# Patient Record
Sex: MALE | Race: BLACK OR AFRICAN AMERICAN | NOT HISPANIC OR LATINO | Employment: FULL TIME | URBAN - METROPOLITAN AREA
[De-identification: names, ages, dates, MRNs, and addresses within clinical notes are randomized per-mention and may not be internally consistent; named-entity substitution may affect disease eponyms.]

---

## 2020-08-12 ENCOUNTER — OFFICE VISIT (OUTPATIENT)
Dept: OPTOMETRY | Facility: CLINIC | Age: 30
End: 2020-08-12
Payer: COMMERCIAL

## 2020-08-12 DIAGNOSIS — Z46.0 FITTING AND ADJUSTMENT OF SPECTACLES AND CONTACT LENSES: Primary | ICD-10-CM

## 2020-08-12 DIAGNOSIS — H52.13 MYOPIA OF BOTH EYES: Primary | ICD-10-CM

## 2020-08-12 PROCEDURE — 99999 PR PBB SHADOW E&M-NEW PATIENT-LVL II: CPT | Mod: PBBFAC,,, | Performed by: OPTOMETRIST

## 2020-08-12 PROCEDURE — 92004 COMPRE OPH EXAM NEW PT 1/>: CPT | Mod: S$GLB,,, | Performed by: OPTOMETRIST

## 2020-08-12 PROCEDURE — 92004 PR EYE EXAM, NEW PATIENT,COMPREHESV: ICD-10-PCS | Mod: S$GLB,,, | Performed by: OPTOMETRIST

## 2020-08-12 PROCEDURE — 99999 PR PBB SHADOW E&M-NEW PATIENT-LVL II: ICD-10-PCS | Mod: PBBFAC,,, | Performed by: OPTOMETRIST

## 2020-08-12 PROCEDURE — 92015 PR REFRACTION: ICD-10-PCS | Mod: S$GLB,,, | Performed by: OPTOMETRIST

## 2020-08-12 PROCEDURE — 92015 DETERMINE REFRACTIVE STATE: CPT | Mod: S$GLB,,, | Performed by: OPTOMETRIST

## 2020-08-12 PROCEDURE — 92310 PR CONTACT LENS FITTING (NO CHANGE): ICD-10-PCS | Mod: CSM,,, | Performed by: OPTOMETRIST

## 2020-08-12 PROCEDURE — 92310 CONTACT LENS FITTING OU: CPT | Mod: CSM,,, | Performed by: OPTOMETRIST

## 2020-08-12 NOTE — PROGRESS NOTES
MONIKA FAGAN; 2019  Pt states no [facility] problems with his glasses hasn't worn contacts (OASYS) for   about 6 months. Pt would like to get new glasses, work glasses, and   contacts.   No f/f  No gtts   No sx      Last edited by Ernesto Woo, OD on 8/12/2020  1:53 PM. (History)        ROS     Negative for: Constitutional, Gastrointestinal, Neurological, Skin,   Genitourinary, Musculoskeletal, HENT, Endocrine, Cardiovascular, Eyes,   Respiratory, Psychiatric, Allergic/Imm, Heme/Lymph    Last edited by Ernesto Woo, OD on 8/12/2020  1:53 PM. (History)        Assessment /Plan     For exam results, see Encounter Report.    Myopia of both eyes      Good fit/[facility] q OASYS CLs--pt happy    PLAN:    1. Wrote new spex/CLRx  2. Continue Daily Wear schedule.  NO SLEEPING IN CONTACT LENSES. Clean and disinfect nightly.  exchange monthly.    3. rtc 1 yr

## 2020-11-09 ENCOUNTER — LAB VISIT (OUTPATIENT)
Dept: LAB | Facility: HOSPITAL | Age: 30
End: 2020-11-09
Attending: INTERNAL MEDICINE
Payer: COMMERCIAL

## 2020-11-09 ENCOUNTER — OFFICE VISIT (OUTPATIENT)
Dept: FAMILY MEDICINE | Facility: CLINIC | Age: 30
End: 2020-11-09
Payer: COMMERCIAL

## 2020-11-09 VITALS
SYSTOLIC BLOOD PRESSURE: 130 MMHG | TEMPERATURE: 98 F | HEIGHT: 65 IN | BODY MASS INDEX: 32.44 KG/M2 | HEART RATE: 69 BPM | WEIGHT: 194.69 LBS | DIASTOLIC BLOOD PRESSURE: 84 MMHG | OXYGEN SATURATION: 99 %

## 2020-11-09 DIAGNOSIS — Z00.00 ROUTINE ADULT HEALTH MAINTENANCE: ICD-10-CM

## 2020-11-09 DIAGNOSIS — Z11.4 ENCOUNTER FOR SCREENING FOR HUMAN IMMUNODEFICIENCY VIRUS (HIV): ICD-10-CM

## 2020-11-09 DIAGNOSIS — R10.13 DYSPEPSIA: ICD-10-CM

## 2020-11-09 DIAGNOSIS — Z23 NEED FOR DIPHTHERIA-TETANUS-PERTUSSIS (TDAP) VACCINE: ICD-10-CM

## 2020-11-09 DIAGNOSIS — Z11.59 ENCOUNTER FOR HEPATITIS C SCREENING TEST FOR LOW RISK PATIENT: ICD-10-CM

## 2020-11-09 DIAGNOSIS — Z23 NEEDS FLU SHOT: ICD-10-CM

## 2020-11-09 DIAGNOSIS — Z00.00 ROUTINE ADULT HEALTH MAINTENANCE: Primary | ICD-10-CM

## 2020-11-09 DIAGNOSIS — M54.40 ACUTE LEFT-SIDED LOW BACK PAIN WITH SCIATICA, SCIATICA LATERALITY UNSPECIFIED: ICD-10-CM

## 2020-11-09 LAB
25(OH)D3+25(OH)D2 SERPL-MCNC: 24 NG/ML (ref 30–96)
ALBUMIN SERPL BCP-MCNC: 4 G/DL (ref 3.5–5.2)
ALP SERPL-CCNC: 115 U/L (ref 55–135)
ALT SERPL W/O P-5'-P-CCNC: 16 U/L (ref 10–44)
ANION GAP SERPL CALC-SCNC: 7 MMOL/L (ref 8–16)
AST SERPL-CCNC: 16 U/L (ref 10–40)
BASOPHILS # BLD AUTO: 0.02 K/UL (ref 0–0.2)
BASOPHILS NFR BLD: 0.3 % (ref 0–1.9)
BILIRUB SERPL-MCNC: 0.5 MG/DL (ref 0.1–1)
BUN SERPL-MCNC: 11 MG/DL (ref 6–20)
CALCIUM SERPL-MCNC: 9.8 MG/DL (ref 8.7–10.5)
CHLORIDE SERPL-SCNC: 101 MMOL/L (ref 95–110)
CHOLEST SERPL-MCNC: 133 MG/DL (ref 120–199)
CHOLEST/HDLC SERPL: 2.6 {RATIO} (ref 2–5)
CO2 SERPL-SCNC: 31 MMOL/L (ref 23–29)
CREAT SERPL-MCNC: 1.1 MG/DL (ref 0.5–1.4)
CRP SERPL-MCNC: 13.8 MG/L (ref 0–8.2)
DIFFERENTIAL METHOD: NORMAL
EOSINOPHIL # BLD AUTO: 0.1 K/UL (ref 0–0.5)
EOSINOPHIL NFR BLD: 0.7 % (ref 0–8)
ERYTHROCYTE [DISTWIDTH] IN BLOOD BY AUTOMATED COUNT: 13.3 % (ref 11.5–14.5)
EST. GFR  (AFRICAN AMERICAN): >60 ML/MIN/1.73 M^2
EST. GFR  (NON AFRICAN AMERICAN): >60 ML/MIN/1.73 M^2
ESTIMATED AVG GLUCOSE: 114 MG/DL (ref 68–131)
GLUCOSE SERPL-MCNC: 90 MG/DL (ref 70–110)
HBA1C MFR BLD HPLC: 5.6 % (ref 4–5.6)
HCT VFR BLD AUTO: 46.6 % (ref 40–54)
HCV AB SERPL QL IA: NEGATIVE
HDLC SERPL-MCNC: 51 MG/DL (ref 40–75)
HDLC SERPL: 38.3 % (ref 20–50)
HGB BLD-MCNC: 15.4 G/DL (ref 14–18)
HIV 1+2 AB+HIV1 P24 AG SERPL QL IA: NEGATIVE
IMM GRANULOCYTES # BLD AUTO: 0.01 K/UL (ref 0–0.04)
IMM GRANULOCYTES NFR BLD AUTO: 0.1 % (ref 0–0.5)
LDLC SERPL CALC-MCNC: 72.6 MG/DL (ref 63–159)
LYMPHOCYTES # BLD AUTO: 2.1 K/UL (ref 1–4.8)
LYMPHOCYTES NFR BLD: 29.4 % (ref 18–48)
MCH RBC QN AUTO: 28.5 PG (ref 27–31)
MCHC RBC AUTO-ENTMCNC: 33 G/DL (ref 32–36)
MCV RBC AUTO: 86 FL (ref 82–98)
MONOCYTES # BLD AUTO: 0.5 K/UL (ref 0.3–1)
MONOCYTES NFR BLD: 7.5 % (ref 4–15)
NEUTROPHILS # BLD AUTO: 4.5 K/UL (ref 1.8–7.7)
NEUTROPHILS NFR BLD: 62 % (ref 38–73)
NONHDLC SERPL-MCNC: 82 MG/DL
NRBC BLD-RTO: 0 /100 WBC
PLATELET # BLD AUTO: 268 K/UL (ref 150–350)
PMV BLD AUTO: 10.1 FL (ref 9.2–12.9)
POTASSIUM SERPL-SCNC: 3.9 MMOL/L (ref 3.5–5.1)
PROT SERPL-MCNC: 8.5 G/DL (ref 6–8.4)
RBC # BLD AUTO: 5.4 M/UL (ref 4.6–6.2)
SODIUM SERPL-SCNC: 139 MMOL/L (ref 136–145)
TRIGL SERPL-MCNC: 47 MG/DL (ref 30–150)
WBC # BLD AUTO: 7.21 K/UL (ref 3.9–12.7)

## 2020-11-09 PROCEDURE — 90686 FLU VACCINE (QUAD) GREATER THAN OR EQUAL TO 3YO PRESERVATIVE FREE IM: ICD-10-PCS | Mod: S$GLB,,, | Performed by: INTERNAL MEDICINE

## 2020-11-09 PROCEDURE — 80053 COMPREHEN METABOLIC PANEL: CPT

## 2020-11-09 PROCEDURE — 85025 COMPLETE CBC W/AUTO DIFF WBC: CPT

## 2020-11-09 PROCEDURE — 90715 TDAP VACCINE 7 YRS/> IM: CPT | Mod: S$GLB,,, | Performed by: INTERNAL MEDICINE

## 2020-11-09 PROCEDURE — 99204 OFFICE O/P NEW MOD 45 MIN: CPT | Mod: 25,S$GLB,, | Performed by: INTERNAL MEDICINE

## 2020-11-09 PROCEDURE — 83036 HEMOGLOBIN GLYCOSYLATED A1C: CPT

## 2020-11-09 PROCEDURE — 90472 IMMUNIZATION ADMIN EACH ADD: CPT | Mod: S$GLB,,, | Performed by: INTERNAL MEDICINE

## 2020-11-09 PROCEDURE — 86703 HIV-1/HIV-2 1 RESULT ANTBDY: CPT

## 2020-11-09 PROCEDURE — 90715 TDAP VACCINE GREATER THAN OR EQUAL TO 7YO IM: ICD-10-PCS | Mod: S$GLB,,, | Performed by: INTERNAL MEDICINE

## 2020-11-09 PROCEDURE — 86803 HEPATITIS C AB TEST: CPT

## 2020-11-09 PROCEDURE — 90471 IMMUNIZATION ADMIN: CPT | Mod: S$GLB,,, | Performed by: INTERNAL MEDICINE

## 2020-11-09 PROCEDURE — 3008F PR BODY MASS INDEX (BMI) DOCUMENTED: ICD-10-PCS | Mod: CPTII,S$GLB,, | Performed by: INTERNAL MEDICINE

## 2020-11-09 PROCEDURE — 36415 COLL VENOUS BLD VENIPUNCTURE: CPT

## 2020-11-09 PROCEDURE — 86140 C-REACTIVE PROTEIN: CPT

## 2020-11-09 PROCEDURE — 99999 PR PBB SHADOW E&M-EST. PATIENT-LVL IV: ICD-10-PCS | Mod: PBBFAC,,, | Performed by: INTERNAL MEDICINE

## 2020-11-09 PROCEDURE — 82306 VITAMIN D 25 HYDROXY: CPT

## 2020-11-09 PROCEDURE — 3008F BODY MASS INDEX DOCD: CPT | Mod: CPTII,S$GLB,, | Performed by: INTERNAL MEDICINE

## 2020-11-09 PROCEDURE — 80061 LIPID PANEL: CPT

## 2020-11-09 PROCEDURE — 90472 TDAP VACCINE GREATER THAN OR EQUAL TO 7YO IM: ICD-10-PCS | Mod: S$GLB,,, | Performed by: INTERNAL MEDICINE

## 2020-11-09 PROCEDURE — 99999 PR PBB SHADOW E&M-EST. PATIENT-LVL IV: CPT | Mod: PBBFAC,,, | Performed by: INTERNAL MEDICINE

## 2020-11-09 PROCEDURE — 99204 PR OFFICE/OUTPT VISIT, NEW, LEVL IV, 45-59 MIN: ICD-10-PCS | Mod: 25,S$GLB,, | Performed by: INTERNAL MEDICINE

## 2020-11-09 PROCEDURE — 90471 FLU VACCINE (QUAD) GREATER THAN OR EQUAL TO 3YO PRESERVATIVE FREE IM: ICD-10-PCS | Mod: S$GLB,,, | Performed by: INTERNAL MEDICINE

## 2020-11-09 PROCEDURE — 90686 IIV4 VACC NO PRSV 0.5 ML IM: CPT | Mod: S$GLB,,, | Performed by: INTERNAL MEDICINE

## 2020-11-09 RX ORDER — PANTOPRAZOLE SODIUM 40 MG/1
40 TABLET, DELAYED RELEASE ORAL DAILY
Qty: 30 TABLET | Refills: 11 | Status: SHIPPED | OUTPATIENT
Start: 2020-11-09 | End: 2021-05-15 | Stop reason: ALTCHOICE

## 2020-11-09 RX ORDER — MELOXICAM 15 MG/1
15 TABLET ORAL DAILY
Qty: 30 TABLET | Refills: 0 | Status: SHIPPED | OUTPATIENT
Start: 2020-11-09 | End: 2020-12-05 | Stop reason: SDUPTHER

## 2020-11-09 RX ORDER — CYCLOBENZAPRINE HCL 5 MG
5 TABLET ORAL 3 TIMES DAILY PRN
Qty: 40 TABLET | Refills: 1 | Status: SHIPPED | OUTPATIENT
Start: 2020-11-09 | End: 2020-12-09

## 2020-11-09 RX ORDER — LACTOBACILLUS ACIDOPH-L.BULGARICUS 1 MILLION CELL CHEWABLE TABLET 1MM CELL
2 TABLET,CHEWABLE ORAL 2 TIMES DAILY WITH MEALS
Qty: 40 TABLET | Refills: 0 | COMMUNITY
Start: 2020-11-09 | End: 2021-05-15 | Stop reason: ALTCHOICE

## 2020-11-09 RX ORDER — L. ACIDOPHILUS/L.BULGARICUS 100MM CELL
1 GRANULES IN PACKET (EA) ORAL 2 TIMES DAILY
COMMUNITY
Start: 2020-11-09 | End: 2020-11-09 | Stop reason: SDUPTHER
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
## 2020-11-12 ENCOUNTER — PATIENT MESSAGE (OUTPATIENT)
Dept: FAMILY MEDICINE | Facility: CLINIC | Age: 30
End: 2020-11-12

## 2020-11-12 DIAGNOSIS — E55.9 VITAMIN D DEFICIENCY: Primary | ICD-10-CM

## 2020-11-12 RX ORDER — CHOLECALCIFEROL (VITAMIN D3) 25 MCG
1000 TABLET ORAL DAILY
Qty: 30 TABLET | Refills: 3 | Status: SHIPPED | OUTPATIENT
Start: 2020-11-12 | End: 2021-05-15 | Stop reason: ALTCHOICE

## 2020-11-13 NOTE — TELEPHONE ENCOUNTER
Most of your test results are acceptable and can be reviewed on follow up appointment. The only change needed in treatment would be a vit d supplement. No changes in treatment needed. Follow up as recommended. Please don't hesitate to call with any concerns.

## 2020-11-27 ENCOUNTER — OFFICE VISIT (OUTPATIENT)
Dept: FAMILY MEDICINE | Facility: CLINIC | Age: 30
End: 2020-11-27
Payer: COMMERCIAL

## 2020-11-27 VITALS
OXYGEN SATURATION: 96 % | HEART RATE: 88 BPM | TEMPERATURE: 98 F | BODY MASS INDEX: 32.99 KG/M2 | HEIGHT: 65 IN | SYSTOLIC BLOOD PRESSURE: 124 MMHG | DIASTOLIC BLOOD PRESSURE: 86 MMHG | WEIGHT: 198 LBS

## 2020-11-27 DIAGNOSIS — E55.9 VITAMIN D DEFICIENCY: ICD-10-CM

## 2020-11-27 DIAGNOSIS — R73.9 HYPERGLYCEMIA: ICD-10-CM

## 2020-11-27 DIAGNOSIS — M54.40 ACUTE LEFT-SIDED LOW BACK PAIN WITH SCIATICA, SCIATICA LATERALITY UNSPECIFIED: Primary | ICD-10-CM

## 2020-11-27 DIAGNOSIS — R10.13 DYSPEPSIA: ICD-10-CM

## 2020-11-27 PROCEDURE — 3008F BODY MASS INDEX DOCD: CPT | Mod: CPTII,S$GLB,, | Performed by: INTERNAL MEDICINE

## 2020-11-27 PROCEDURE — 3008F PR BODY MASS INDEX (BMI) DOCUMENTED: ICD-10-PCS | Mod: CPTII,S$GLB,, | Performed by: INTERNAL MEDICINE

## 2020-11-27 PROCEDURE — 1126F AMNT PAIN NOTED NONE PRSNT: CPT | Mod: S$GLB,,, | Performed by: INTERNAL MEDICINE

## 2020-11-27 PROCEDURE — 99214 OFFICE O/P EST MOD 30 MIN: CPT | Mod: S$GLB,,, | Performed by: INTERNAL MEDICINE

## 2020-11-27 PROCEDURE — 99999 PR PBB SHADOW E&M-EST. PATIENT-LVL IV: CPT | Mod: PBBFAC,,, | Performed by: INTERNAL MEDICINE

## 2020-11-27 PROCEDURE — 99214 PR OFFICE/OUTPT VISIT, EST, LEVL IV, 30-39 MIN: ICD-10-PCS | Mod: S$GLB,,, | Performed by: INTERNAL MEDICINE

## 2020-11-27 PROCEDURE — 99999 PR PBB SHADOW E&M-EST. PATIENT-LVL IV: ICD-10-PCS | Mod: PBBFAC,,, | Performed by: INTERNAL MEDICINE

## 2020-11-27 PROCEDURE — 1126F PR PAIN SEVERITY QUANTIFIED, NO PAIN PRESENT: ICD-10-PCS | Mod: S$GLB,,, | Performed by: INTERNAL MEDICINE

## 2020-11-27 NOTE — PROGRESS NOTES
Subjective:       Patient ID: Pasquale Birch is a 30 y.o. male.    Chief Complaint: No chief complaint on file.      This is a 30-year-old man who is here for follow-up of back pains and gastrointestinal symptoms.  Labs were stable except for mild decrease in vitamin-D.  Patient reports that his back and leg pains are better, down to a 1-2/10 occasional with changes in position.  Continues to take the muscle relaxer at night.  He is low abdominal pain is better with occasional soreness but no changes in bowel habits.  Was not able to get the probiotics.    Review of Systems   Constitutional: Negative for appetite change, chills, fatigue and fever.   HENT: Negative for nasal congestion, rhinorrhea and sore throat.    Respiratory: Negative for cough and shortness of breath.    Cardiovascular: Negative for chest pain, palpitations and leg swelling.   Gastrointestinal: Negative for abdominal pain (Low abdominal discomfort with bloating occasionally.), change in bowel habit, constipation, diarrhea, nausea, vomiting and change in bowel habit.   Genitourinary: Negative for bladder incontinence, difficulty urinating and dysuria.   Musculoskeletal: Positive for leg pain (Mild, 1-2/10 occasionally d on movement).   Neurological: Negative for dizziness, weakness, light-headedness, numbness and headaches.   Hematological: Does not bruise/bleed easily.   Psychiatric/Behavioral: Negative for sleep disturbance. The patient is not nervous/anxious.       No past medical history on file.    No past surgical history on file.    Family History   Problem Relation Age of Onset    Diabetes Mellitus Maternal Grandmother     Hypertension Maternal Grandmother        Social History     Socioeconomic History    Marital status: Single     Spouse name: Not on file    Number of children: Not on file    Years of education: Not on file    Highest education level: Not on file   Occupational History    Not on file   Social Needs    Financial  "resource strain: Not hard at all    Food insecurity     Worry: Never true     Inability: Never true    Transportation needs     Medical: No     Non-medical: No   Tobacco Use    Smoking status: Never Smoker    Smokeless tobacco: Never Used   Substance and Sexual Activity    Alcohol Use     Frequency: 2-4 times a month     Drinks per session: 1 or 2     Binge frequency: Never    Drug use: Not on file    Sexual activity: Not on file   Lifestyle    Physical activity     Days per week: 4 days     Minutes per session: 30 min    Stress: Not at all   Relationships    Social connections     Talks on phone: Twice a week     Gets together: Never     Attends Pentecostal service: Not on file     Active member of club or organization: No     Attends meetings of clubs or organizations: Never     Relationship status: Living with partner   Other Topics Concern    Not on file   Social History Narrative    Not on file       Current Outpatient Medications   Medication Sig Dispense Refill    cyclobenzaprine (FLEXERIL) 5 MG tablet Take 1 tablet (5 mg total) by mouth 3 (three) times daily as needed for Muscle spasms. 40 tablet 1    Lactobacillus acidoph-L.bulgar (LACTINEX) 1 million cell Chew Take 2 tablets by mouth 2 (two) times daily with meals. 40 tablet 0    meloxicam (MOBIC) 15 MG tablet Take 1 tablet (15 mg total) by mouth once daily. 30 tablet 0    pantoprazole (PROTONIX) 40 MG tablet Take 1 tablet (40 mg total) by mouth once daily. 30 tablet 11    vitamin D (VITAMIN D3) 1000 units Tab Take 1 tablet (1,000 Units total) by mouth once daily. 30 tablet 3     No current facility-administered medications for this visit.        Review of patient's allergies indicates:  No Known Allergies      Objective:       Last 3 sets of Vitals    Vitals - 1 value per visit 8/12/2020 11/9/2020   SYSTOLIC - 130   DIASTOLIC - 84   PULSE - 69   TEMPERATURE - 98.3   SPO2 - 99   Weight (lb) - 194.67   Weight (kg) - 88.3   HEIGHT - 5' 5" "   BODY MASS INDEX - 32.39   VISIT REPORT - -   Pain Score  0 -   Physical Exam  Constitutional:       Appearance: Normal appearance.   HENT:      Head: Normocephalic.   Eyes:      General: No scleral icterus.     Extraocular Movements: Extraocular movements intact.      Conjunctiva/sclera: Conjunctivae normal.      Pupils: Pupils are equal, round, and reactive to light.   Neck:      Musculoskeletal: Neck supple.      Vascular: No carotid bruit.   Cardiovascular:      Rate and Rhythm: Normal rate and regular rhythm.      Pulses: Normal pulses.      Heart sounds: Normal heart sounds.   Pulmonary:      Effort: Pulmonary effort is normal.      Breath sounds: Normal breath sounds.   Abdominal:      General: Bowel sounds are normal. There is no distension.      Palpations: Abdomen is soft. There is no mass.      Tenderness: There is no abdominal tenderness.   Musculoskeletal: Normal range of motion.         General: No swelling.   Skin:     General: Skin is warm.   Neurological:      General: No focal deficit present.      Mental Status: He is alert and oriented to person, place, and time.   Psychiatric:         Mood and Affect: Mood normal.         Behavior: Behavior normal.           CBC:  Recent Labs   Lab Result Units 11/09/20  0931   WBC K/uL 7.21   RBC M/uL 5.40   Hemoglobin g/dL 15.4   Hematocrit % 46.6   Platelets K/uL 268   MCV fL 86   MCH pg 28.5   MCHC g/dL 33.0     CMP:  Recent Labs   Lab Result Units 11/09/20  0931   Glucose mg/dL 90   Calcium mg/dL 9.8   Albumin g/dL 4.0   Total Protein g/dL 8.5*   Sodium mmol/L 139   Potassium mmol/L 3.9   CO2 mmol/L 31*   Chloride mmol/L 101   BUN mg/dL 11   Alkaline Phosphatase U/L 115   ALT U/L 16   AST U/L 16   Total Bilirubin mg/dL 0.5     URINALYSIS:  Recent Labs   Lab Result Units 11/09/20  0916   Color, UA  Yellow   Specific Gravity, UA  1.015   pH, UA  6.0   Protein, UA  Negative   Nitrite, UA  Negative   Leukocytes, UA  Negative   Urobilinogen, UA EU/dL Negative       LIPIDS:  Recent Labs   Lab Result Units 11/09/20  0931   HDL mg/dL 51   Cholesterol mg/dL 133   Triglycerides mg/dL 47   LDL Cholesterol mg/dL 72.6   HDL/Cholesterol Ratio % 38.3   Non-HDL Cholesterol mg/dL 82   Total Cholesterol/HDL Ratio  2.6     TSH:  No results for input(s): TSH in the last 2160 hours.    A1C:  Recent Labs   Lab 11/09/20  0931   Hemoglobin A1C 5.6           Assessment:       1. Acute left-sided low back pain with sciatica, sciatica laterality unspecified    2. Dyspepsia    3. Vitamin D deficiency    4. Hyperglycemia    5. BMI 32.0-32.9,adult        Plan:       Pasquale was seen today for follow-up.    Diagnoses and all orders for this visit:    Acute left-sided low back pain with sciatica, sciatica laterality unspecified   - Improved.  Continue muscle relaxers and anti-inflammatory as needed.    Dyspepsia   - Trial of probiotics.    Vitamin D deficiency  -     Vitamin D; Future  - Continue vitamin-D supplements.    Hyperglycemia  -    Noted with borderline hemoglobin A1c.  - lifestyle modification with diet and exercise with weight control.  -     Comprehensive Metabolic Panel; Future  -     Hemoglobin A1C; Future    BMI 32.0-32.9,adult  -     Lipid Panel; Future  -     TSH; Standing        - Lifestyle modification with diet and exercise with weight control.      Return to clinic within 6 months.

## 2020-11-27 NOTE — PATIENT INSTRUCTIONS
When you have type 2 diabetes, what you eat can help you control your blood sugar, stave off hunger, and feel full longer.  Diabetes is when your blood sugar or glucose levels are higher than normal. Its carbohydrate foods like breads, cereals, rice, pasta, fruits, milk, and desserts that can cause this rise.  Your eating plan should focus on the amount and type of carbs you put on your plate throughout the day.    1. Raw, Cooked, or Roasted Vegetables- These add color, flavor, and texture to a meal. Choose tasty, low-carb veggies, like mushrooms, onions, eggplant, tomatoes, [place] sprouts, and low-carb squashes, like zucchini. Try them with dips such as low-fat dressings, hummus, guacamole, and salsa, or roasted with different seasonings such as rosemary, cayenne pepper, or garlic.    2. Greens- Go beyond your regular salad and try kale, spinach, and chard. Theyre healthy, delicious, and low-carb. Roast kale leaves in the oven with olive oil for quick, crunchy chips. You can also mix greens in with roasted veggies to add texture and a different flavor, or serve them with a little protein, like salmon.    3. Flavorful, Low-calorie Drinks- Plain water is always good, but water infused with fruits and vegetables is more interesting. Cut up a lemon or cucumber and put it in your water, or make ice cubes with some flavoring in them.  If youre not a hot tea drinker, try cold tea with lemon or a cinnamon stick. Not only are these beverages low-carb, they can also help fill you up so you dont crave other foods.    4. Berries- Did you know that 1 cup of either of these has just 15 grams of carbs?  Its a little more expensive, but its a healthy treat packed with nutrients and fiber, and its a little bit sweet.  For a different twist, mix the berries with plain yogurt, or put them in ice cubes.    5. Whole-grain, Higher-fiber Foods- Try legumes like dried beans, peas, and lentils. These foods still have carbs, but  "they have interesting flavors that help keep you satisfied.    6. A Little Fat- Good fat choices include olive oil, avocado, and fatty fishes -- think salmon served on of a bed of lettuce, for example.    7. Protein- Greek yogurt, cottage cheese, eggs, and lean meats.     And dont forget treats- Peanut butter on a celery stick is a good fat and protein mix for a healthy, satisfying snack. You can also snack on a lower-fat cheese stick or a beef jerky stick -- but keep an eye on how much sodium is in them.    _________________________________________________    What it Takes to Lose Weight     What does it take to lose weight?  To lose weight, you have to eat fewer calories than your body uses. Calories are the amount of energy in the food you eat. Some foods have more calories than others. For example, foods that are high in fat and sugar are also high in calories. If you eat more calories than your body uses, the extra calories will be stored as body excess fat.  A pound of fat is about 3,500 calories. To lose 1 pound of fat in a week, you have to eat 3,500 fewer calories (that is 500 fewer calories a day), or you have to "burn off" an extra 3,500 calories. You can burn off calories by exercising or just by being more active. (Talk to your family doctor before you begin any type of exercise program. Your doctor can help you determine what kind of exercise program is right for you.)  The best way to lose weight and keep it off is to eat fewer calories and be active. If you cut 250 calories from your diet each day and exercise enough to burn off 250 calories, that adds up to 500 fewer calories in one day. If you do this for 7 days, you can lose 1 pound of fat in a week.  Many experts believe you should not try to lose more than 2 pounds per week. Losing more than 2 pounds in a week usually means that you are losing water weight and lean muscle mass instead of losing excess fat. If you do this, you will have less " "energy, and you will most likely gain the weight back.    How often should I eat?  Most people can eat 3 regular meals and 1 snack every day. The 3 meals should be about the same size and should be low in fat. Try to eat 1 to 2 cups of fruits and vegetables, 2 to 3 ounces of whole grains and 1 to 2 ounces of meat (or a meat alternative) at most meals.  Some people benefit more if they eat 5 to 6 smaller meals throughout the day, about 2 to 3 hours apart. For example, their first meal of the day might be a cup of low-fat or nonfat yogurt and a banana. Three hours later they might eat a simple deli sandwich with whole-grain bread and fat-free mayonnaise.  Eat breakfast and don't skip meals. While skipping meals may help you lose weight in the beginning, it fails in the long run. Skipping meals may make you feel too hungry later in the day, causing you to overeat at your next meal.  After about a month of eating a normal breakfast, lunch and a light dinner, your body will adjust.    What is so bad about high-fat foods?  Foods high in fat are usually high in calories, which could lead to unwanted weight gain. Consuming too much saturated and trans fats may increase LDL cholesterol ("bad" cholesterol) level, and increase your risk of heart disease. The USDA suggests that you eat no more than 20 grams of saturated fat, and that you limit the amount of trans fat to as close to 0 grams as possible. Click here for more information on reading nutrition labels.  It is important to remember that some fats can be beneficial to your overall health. "Good" fat, such as polyunsaturated and monounsaturated fats are found in fish, nuts, and low- or nonfat dairy products.    What are empty calories?  Some foods are referred to as "empty calories" because they add lots of calories to your diet without providing much nutritional benefit. An example is sugar-sweetened drinks, such as fruit juice, fruit drinks, regular soft drinks, sports " "drinks, energy drinks, sweetened or flavored milk and sweetened iced tea. These drinks can add lots of sugar and calories to your diet.  But staying hydrated is important for good health. To reduce the calories and sugar in your diet, substitute water, zero-calorie flavored water, non-fat or reduced-fat milk, unsweetened tea or diet soda for sweetened drinks. Talk with your family doctor or a dietitian if you have questions about your diet or healthy eating for your family.    Can I trust nutrition information I get from newspapers and magazines?  Nutrition tips and diet information from different sources often conflict with each other. You should always check with your doctor first. Also, keep in mind the following advice:  There is no "magic bullet" when it comes to nutrition. There isn't one single diet that works for every person. You need to find an eating plan that works for you.   Good nutrition doesn't come in a vitamin supplement. Only take a vitamin with your doctor's recommendation, as your body benefits the most when you eat healthy foods.   Eating all different kinds of foods is best for your body, so learn to try new foods.   Fad diets offer short-term changes, but good health comes from long-term effort and commitment.   Stories from people who have used a diet program or product, especially in commercials and [employer], are advertisements. These people are usually paid to endorse what the ad is selling. Remember, regained weight or other problems that develop after someone has completed the diet program are never talked about in those ads.     Will diet drugs help?  Although diet drugs may help you lose weight at first, they usually don't help you keep the weight off and may have damaging side effects. Most diet pills have not been tested by the Food and Drug Administration, which means you can't be sure if the drugs are safe. Taking drugs also does not help you learn how to change your eating and " exercise habits. Making lasting changes in these habits is the way to lose weight and keep it off.

## 2021-04-22 ENCOUNTER — PATIENT MESSAGE (OUTPATIENT)
Dept: FAMILY MEDICINE | Facility: CLINIC | Age: 31
End: 2021-04-22

## 2021-05-15 ENCOUNTER — OFFICE VISIT (OUTPATIENT)
Dept: URGENT CARE | Facility: CLINIC | Age: 31
End: 2021-05-15
Payer: COMMERCIAL

## 2021-05-15 VITALS
SYSTOLIC BLOOD PRESSURE: 136 MMHG | DIASTOLIC BLOOD PRESSURE: 90 MMHG | RESPIRATION RATE: 16 BRPM | HEART RATE: 84 BPM | TEMPERATURE: 99 F | OXYGEN SATURATION: 96 %

## 2021-05-15 DIAGNOSIS — Z11.9 SCREENING EXAMINATION FOR UNSPECIFIED INFECTIOUS DISEASE: Primary | ICD-10-CM

## 2021-05-15 DIAGNOSIS — U07.1 COVID-19 VIRUS DETECTED: ICD-10-CM

## 2021-05-15 DIAGNOSIS — U07.1 COVID-19 VIRUS INFECTION: ICD-10-CM

## 2021-05-15 LAB
CTP QC/QA: YES
SARS-COV-2 RDRP RESP QL NAA+PROBE: POSITIVE

## 2021-05-15 PROCEDURE — 99214 PR OFFICE/OUTPT VISIT, EST, LEVL IV, 30-39 MIN: ICD-10-PCS | Mod: S$GLB,CS,, | Performed by: NURSE PRACTITIONER

## 2021-05-15 PROCEDURE — 99214 OFFICE O/P EST MOD 30 MIN: CPT | Mod: S$GLB,CS,, | Performed by: NURSE PRACTITIONER

## 2021-05-15 PROCEDURE — U0002 COVID-19 LAB TEST NON-CDC: HCPCS | Mod: QW,S$GLB,, | Performed by: NURSE PRACTITIONER

## 2021-05-15 PROCEDURE — U0002: ICD-10-PCS | Mod: QW,S$GLB,, | Performed by: NURSE PRACTITIONER

## 2022-01-10 ENCOUNTER — PATIENT MESSAGE (OUTPATIENT)
Dept: ADMINISTRATIVE | Facility: HOSPITAL | Age: 32
End: 2022-01-10
Payer: COMMERCIAL

## 2022-02-22 ENCOUNTER — PATIENT MESSAGE (OUTPATIENT)
Dept: RESEARCH | Facility: HOSPITAL | Age: 32
End: 2022-02-22
Payer: COMMERCIAL

## 2022-03-08 ENCOUNTER — PATIENT MESSAGE (OUTPATIENT)
Dept: OPTOMETRY | Facility: CLINIC | Age: 32
End: 2022-03-08
Payer: COMMERCIAL

## 2022-03-18 ENCOUNTER — OFFICE VISIT (OUTPATIENT)
Dept: OPTOMETRY | Facility: CLINIC | Age: 32
End: 2022-03-18
Payer: COMMERCIAL

## 2022-03-18 DIAGNOSIS — H52.13 MYOPIA OF BOTH EYES: Primary | ICD-10-CM

## 2022-03-18 DIAGNOSIS — Z97.3 WEARS CONTACT LENSES: ICD-10-CM

## 2022-03-18 DIAGNOSIS — Z46.0 FITTING AND ADJUSTMENT OF SPECTACLES AND CONTACT LENSES: Primary | ICD-10-CM

## 2022-03-18 PROCEDURE — 92014 COMPRE OPH EXAM EST PT 1/>: CPT | Mod: S$GLB,,, | Performed by: OPTOMETRIST

## 2022-03-18 PROCEDURE — 99999 PR PBB SHADOW E&M-EST. PATIENT-LVL II: CPT | Mod: PBBFAC,,, | Performed by: OPTOMETRIST

## 2022-03-18 PROCEDURE — 92014 PR EYE EXAM, EST PATIENT,COMPREHESV: ICD-10-PCS | Mod: S$GLB,,, | Performed by: OPTOMETRIST

## 2022-03-18 PROCEDURE — 92015 PR REFRACTION: ICD-10-PCS | Mod: S$GLB,,, | Performed by: OPTOMETRIST

## 2022-03-18 PROCEDURE — 92310 CONTACT LENS FITTING OU: CPT | Mod: S$GLB,,, | Performed by: OPTOMETRIST

## 2022-03-18 PROCEDURE — 92310 PR CONTACT LENS FITTING (NO CHANGE): ICD-10-PCS | Mod: S$GLB,,, | Performed by: OPTOMETRIST

## 2022-03-18 PROCEDURE — 92015 DETERMINE REFRACTIVE STATE: CPT | Mod: S$GLB,,, | Performed by: OPTOMETRIST

## 2022-03-18 PROCEDURE — 99999 PR PBB SHADOW E&M-EST. PATIENT-LVL II: ICD-10-PCS | Mod: PBBFAC,,, | Performed by: OPTOMETRIST

## 2022-03-18 NOTE — PROGRESS NOTES
HPI     32 Y/o male is here for routine eye exam with no C/o about ocular health      Pt denies pain and discomfort   No f/f     Eye med: no gtt     Last edited by Mike Pack MA on 3/18/2022  9:57 AM. (History)        ROS     Negative for: Constitutional, Gastrointestinal, Neurological, Skin,   Genitourinary, Musculoskeletal, HENT, Endocrine, Cardiovascular, Eyes,   Respiratory, Psychiatric, Allergic/Imm, Heme/Lymph    Last edited by Ernesto Woo, OD on 3/18/2022 10:07 AM. (History)        Assessment /Plan     For exam results, see Encounter Report.    Myopia of both eyes    Wears contact lenses      Pt wore OASYS in the past, and wishes to wear again.  Good fit/VA    PLAN:    1. Wrote spex/CLRx  2. Continue Daily Wear schedule.  NO SLEEPING IN CONTACT LENSES. Clean and disinfect nightly.  exchange monthly.    3. rtc 1 yr

## 2022-05-31 ENCOUNTER — PATIENT MESSAGE (OUTPATIENT)
Dept: ADMINISTRATIVE | Facility: HOSPITAL | Age: 32
End: 2022-05-31
Payer: COMMERCIAL

## 2022-07-28 ENCOUNTER — OFFICE VISIT (OUTPATIENT)
Dept: FAMILY MEDICINE | Facility: CLINIC | Age: 32
End: 2022-07-28
Payer: COMMERCIAL

## 2022-07-28 VITALS
WEIGHT: 173.06 LBS | BODY MASS INDEX: 28.83 KG/M2 | HEART RATE: 58 BPM | DIASTOLIC BLOOD PRESSURE: 72 MMHG | OXYGEN SATURATION: 99 % | HEIGHT: 65 IN | SYSTOLIC BLOOD PRESSURE: 114 MMHG

## 2022-07-28 DIAGNOSIS — Z00.00 ANNUAL PHYSICAL EXAM: Primary | ICD-10-CM

## 2022-07-28 DIAGNOSIS — M79.671 FOOT PAIN, RIGHT: ICD-10-CM

## 2022-07-28 DIAGNOSIS — H92.01 EAR DISCOMFORT, RIGHT: ICD-10-CM

## 2022-07-28 PROCEDURE — 3074F PR MOST RECENT SYSTOLIC BLOOD PRESSURE < 130 MM HG: ICD-10-PCS | Mod: CPTII,S$GLB,, | Performed by: INTERNAL MEDICINE

## 2022-07-28 PROCEDURE — 3078F DIAST BP <80 MM HG: CPT | Mod: CPTII,S$GLB,, | Performed by: INTERNAL MEDICINE

## 2022-07-28 PROCEDURE — 99999 PR PBB SHADOW E&M-EST. PATIENT-LVL IV: ICD-10-PCS | Mod: PBBFAC,,, | Performed by: INTERNAL MEDICINE

## 2022-07-28 PROCEDURE — 1159F MED LIST DOCD IN RCRD: CPT | Mod: CPTII,S$GLB,, | Performed by: INTERNAL MEDICINE

## 2022-07-28 PROCEDURE — 99999 PR PBB SHADOW E&M-EST. PATIENT-LVL IV: CPT | Mod: PBBFAC,,, | Performed by: INTERNAL MEDICINE

## 2022-07-28 PROCEDURE — 3074F SYST BP LT 130 MM HG: CPT | Mod: CPTII,S$GLB,, | Performed by: INTERNAL MEDICINE

## 2022-07-28 PROCEDURE — 99395 PREV VISIT EST AGE 18-39: CPT | Mod: S$GLB,,, | Performed by: INTERNAL MEDICINE

## 2022-07-28 PROCEDURE — 1160F PR REVIEW ALL MEDS BY PRESCRIBER/CLIN PHARMACIST DOCUMENTED: ICD-10-PCS | Mod: CPTII,S$GLB,, | Performed by: INTERNAL MEDICINE

## 2022-07-28 PROCEDURE — 1160F RVW MEDS BY RX/DR IN RCRD: CPT | Mod: CPTII,S$GLB,, | Performed by: INTERNAL MEDICINE

## 2022-07-28 PROCEDURE — 3008F BODY MASS INDEX DOCD: CPT | Mod: CPTII,S$GLB,, | Performed by: INTERNAL MEDICINE

## 2022-07-28 PROCEDURE — 1159F PR MEDICATION LIST DOCUMENTED IN MEDICAL RECORD: ICD-10-PCS | Mod: CPTII,S$GLB,, | Performed by: INTERNAL MEDICINE

## 2022-07-28 PROCEDURE — 3008F PR BODY MASS INDEX (BMI) DOCUMENTED: ICD-10-PCS | Mod: CPTII,S$GLB,, | Performed by: INTERNAL MEDICINE

## 2022-07-28 PROCEDURE — 99395 PR PREVENTIVE VISIT,EST,18-39: ICD-10-PCS | Mod: S$GLB,,, | Performed by: INTERNAL MEDICINE

## 2022-07-28 PROCEDURE — 3078F PR MOST RECENT DIASTOLIC BLOOD PRESSURE < 80 MM HG: ICD-10-PCS | Mod: CPTII,S$GLB,, | Performed by: INTERNAL MEDICINE

## 2022-07-28 RX ORDER — FLUTICASONE PROPIONATE 50 MCG
2 SPRAY, SUSPENSION (ML) NASAL DAILY
Qty: 16 G | Refills: 2 | Status: SHIPPED | OUTPATIENT
Start: 2022-07-28

## 2022-07-28 NOTE — PROGRESS NOTES
2Subjective:       Patient ID: Pasquale Birch is a 31 y.o. male.    Chief Complaint: Annual Exam (Started about 2 months ago; Right ear pain and right heal pain. )      HPI  Pasquale Birch is a 31 y.o. male with no chronic conditions who presents today for annual evaluation.    Reports overall has been feeling good.  Has been having right ear discomfort that sometimes can be painful. Has sensation of clogged ears and hearing muffleand or decreased. Occasionally will have wax coming out of his ear.  Denies fever, chills, nasal congestion, sore throat, or headaches, shortness of breath.      Does not smoke, has occasional alcohol, exercises.    Only medication is multivitamin.    Had COVID vaccine x2. Other vaccines up to date.    Health Maintenance:  Health Maintenance   Topic Date Due    TETANUS VACCINE  11/09/2030    Hepatitis C Screening  Completed    Lipid Panel  Completed       Review of Systems   Constitutional: Negative for fever.   HENT: Positive for ear discharge, ear pain and hearing loss. Negative for postnasal drip, rhinorrhea, sinus pressure/congestion and sore throat.    Respiratory: Negative for cough.    Cardiovascular: Negative.    Gastrointestinal: Negative for abdominal pain, diarrhea and vomiting.   Musculoskeletal: Positive for arthralgias (Foot pain.). Negative for neck pain.   Integumentary:  Negative for rash.   Neurological: Negative for dizziness and headaches.   Psychiatric/Behavioral: Negative.       No past medical history on file.    No past surgical history on file.    Family History   Problem Relation Age of Onset    Diabetes Mellitus Maternal Grandmother     Hypertension Maternal Grandmother        Social History     Socioeconomic History    Marital status: Single   Tobacco Use    Smoking status: Never Smoker    Smokeless tobacco: Never Used   Substance and Sexual Activity    Alcohol use: Not Currently     Social Determinants of Health     Financial Resource Strain: Low Risk      Difficulty of Paying Living Expenses: Not hard at all   Food Insecurity: Unknown    Worried About Running Out of Food in the Last Year: Never true   Transportation Needs: No Transportation Needs    Lack of Transportation (Medical): No    Lack of Transportation (Non-Medical): No   Physical Activity: Unknown    Minutes of Exercise per Session: 70 min   Stress: No Stress Concern Present    Feeling of Stress : Not at all   Social Connections: Unknown    Frequency of Communication with Friends and Family: Twice a week    Frequency of Social Gatherings with Friends and Family: Once a week    Active Member of Clubs or Organizations: No    Attends Club or Organization Meetings: Never    Marital Status: Never    Housing Stability: High Risk    Unable to Pay for Housing in the Last Year: No    Number of Places Lived in the Last Year: 1    Unstable Housing in the Last Year: Yes       No current outpatient medications on file.     No current facility-administered medications for this visit.       Review of patient's allergies indicates:  No Known Allergies      Objective:       Last 3 sets of Vitals    Vitals - 1 value per visit 3/18/2022 7/28/2022 7/28/2022   SYSTOLIC - - 114   DIASTOLIC - - 72   Pulse - - 58   Temp - - -   Resp - - -   SPO2 - - 99   Weight (lb) - - 173.06   Weight (kg) - - 78.5   Height - - 65   BMI (Calculated) - - 28.8   VISIT REPORT - - -   Pain Score  0 5 -   Physical Exam  Constitutional:       General: He is not in acute distress.  HENT:      Head: Normocephalic.      Right Ear: Tympanic membrane, ear canal and external ear normal.      Left Ear: Tympanic membrane, ear canal and external ear normal.      Ears:      Comments: Wax present, mild. No redness or tenderness.     Nose: Nose normal.      Mouth/Throat:      Mouth: Mucous membranes are moist.      Comments: Postnasal drip  Eyes:      General: No scleral icterus.     Extraocular Movements: Extraocular movements intact.       Conjunctiva/sclera: Conjunctivae normal.   Neck:      Vascular: No carotid bruit.      Comments: No goiter.  Cardiovascular:      Rate and Rhythm: Normal rate and regular rhythm.      Pulses: Normal pulses.      Heart sounds: Normal heart sounds.   Pulmonary:      Effort: Pulmonary effort is normal.      Breath sounds: Normal breath sounds.   Abdominal:      General: Bowel sounds are normal. There is no distension.      Palpations: Abdomen is soft. There is no mass.      Tenderness: There is no abdominal tenderness.   Musculoskeletal:         General: No swelling or tenderness.   Lymphadenopathy:      Cervical: No cervical adenopathy.   Skin:     General: Skin is warm and dry.   Neurological:      General: No focal deficit present.      Mental Status: He is alert and oriented to person, place, and time.   Psychiatric:         Mood and Affect: Mood normal.         Behavior: Behavior normal.           CBC:  Recent Labs   Lab 11/09/20 0931   WBC 7.21   RBC 5.40   Hemoglobin 15.4   Hematocrit 46.6   Platelets 268   MCV 86   MCH 28.5   MCHC 33.0     CMP:  Recent Labs   Lab 11/09/20 0931   Glucose 90   Calcium 9.8   Albumin 4.0   Total Protein 8.5 H   Sodium 139   Potassium 3.9   CO2 31 H   Chloride 101   BUN 11   Creatinine 1.1   Alkaline Phosphatase 115   ALT 16   AST 16   Total Bilirubin 0.5     URINALYSIS:  Recent Labs   Lab 11/09/20  0916   Color, UA Yellow   Specific Gravity, UA 1.015   pH, UA 6.0   Protein, UA Negative   Nitrite, UA Negative   Leukocytes, UA Negative   Urobilinogen, UA Negative      LIPIDS:  Recent Labs   Lab 11/09/20 0931   HDL 51   Cholesterol 133   Triglycerides 47   LDL Cholesterol 72.6   HDL/Cholesterol Ratio 38.3   Non-HDL Cholesterol 82   Total Cholesterol/HDL Ratio 2.6     TSH:        A1C:  Recent Labs   Lab 11/09/20  0931   Hemoglobin A1C 5.6       Imaging:  No image results found.      Assessment:       1. Annual physical exam    2. Foot pain, right    3. Ear discomfort, right             Plan:       Pasquale was seen today for annual exam.    Diagnoses and all orders for this visit:    Annual physical exam  -     CBC Auto Differential; Future  -     Comprehensive Metabolic Panel; Future  -     Hemoglobin A1C; Future  -     Lipid Panel; Future  -     TSH; Future  - stable exam with normal vital signs and healthy weight.    Foot pain, right  -     Ambulatory referral/consult to Podiatry; Future  - suspect degenerative changes, no acute findings.  Anti-inflammatories as needed.  May need shoe insert.    Ear discomfort, right  -     noted postnasal drip and possibly sinus component.  Currently no impacted what or evidence of infection.  -  Ambulatory referral/consult to ENT; Future  -     fluticasone propionate (FLONASE) 50 mcg/actuation nasal spray; 2 sprays (100 mcg total) by Each Nostril route once daily.      Health Maintenance Due   Topic Date Due    COVID-19 Vaccine (3 - Booster for Pfizer series) 01/13/2022        Return to clinic as needed.    Yenny Granger MD  [facility]  Disclaimer:  This note has been generated using voice-recognition software. There may be grammatical or spelling errors that have been missed during proof-reading

## 2022-08-01 ENCOUNTER — PATIENT MESSAGE (OUTPATIENT)
Dept: OPTOMETRY | Facility: CLINIC | Age: 32
End: 2022-08-01
Payer: COMMERCIAL

## 2022-08-04 ENCOUNTER — OFFICE VISIT (OUTPATIENT)
Dept: PODIATRY | Facility: CLINIC | Age: 32
End: 2022-08-04
Payer: COMMERCIAL

## 2022-08-04 VITALS
HEIGHT: 65 IN | HEART RATE: 51 BPM | SYSTOLIC BLOOD PRESSURE: 125 MMHG | DIASTOLIC BLOOD PRESSURE: 80 MMHG | BODY MASS INDEX: 28.83 KG/M2 | WEIGHT: 173.06 LBS

## 2022-08-04 DIAGNOSIS — M79.671 FOOT PAIN, RIGHT: Primary | ICD-10-CM

## 2022-08-04 DIAGNOSIS — M76.61 ACHILLES TENDINITIS OF RIGHT LOWER EXTREMITY: ICD-10-CM

## 2022-08-04 PROCEDURE — 3044F PR MOST RECENT HEMOGLOBIN A1C LEVEL <7.0%: ICD-10-PCS | Mod: CPTII,S$GLB,, | Performed by: PODIATRIST

## 2022-08-04 PROCEDURE — 3008F PR BODY MASS INDEX (BMI) DOCUMENTED: ICD-10-PCS | Mod: CPTII,S$GLB,, | Performed by: PODIATRIST

## 2022-08-04 PROCEDURE — 99999 PR PBB SHADOW E&M-EST. PATIENT-LVL III: ICD-10-PCS | Mod: PBBFAC,,, | Performed by: PODIATRIST

## 2022-08-04 PROCEDURE — 99203 PR OFFICE/OUTPT VISIT, NEW, LEVL III, 30-44 MIN: ICD-10-PCS | Mod: S$GLB,,, | Performed by: PODIATRIST

## 2022-08-04 PROCEDURE — 1159F MED LIST DOCD IN RCRD: CPT | Mod: CPTII,S$GLB,, | Performed by: PODIATRIST

## 2022-08-04 PROCEDURE — 3074F PR MOST RECENT SYSTOLIC BLOOD PRESSURE < 130 MM HG: ICD-10-PCS | Mod: CPTII,S$GLB,, | Performed by: PODIATRIST

## 2022-08-04 PROCEDURE — 3079F PR MOST RECENT DIASTOLIC BLOOD PRESSURE 80-89 MM HG: ICD-10-PCS | Mod: CPTII,S$GLB,, | Performed by: PODIATRIST

## 2022-08-04 PROCEDURE — 3044F HG A1C LEVEL LT 7.0%: CPT | Mod: CPTII,S$GLB,, | Performed by: PODIATRIST

## 2022-08-04 PROCEDURE — 1159F PR MEDICATION LIST DOCUMENTED IN MEDICAL RECORD: ICD-10-PCS | Mod: CPTII,S$GLB,, | Performed by: PODIATRIST

## 2022-08-04 PROCEDURE — 99203 OFFICE O/P NEW LOW 30 MIN: CPT | Mod: S$GLB,,, | Performed by: PODIATRIST

## 2022-08-04 PROCEDURE — 99999 PR PBB SHADOW E&M-EST. PATIENT-LVL III: CPT | Mod: PBBFAC,,, | Performed by: PODIATRIST

## 2022-08-04 PROCEDURE — 3008F BODY MASS INDEX DOCD: CPT | Mod: CPTII,S$GLB,, | Performed by: PODIATRIST

## 2022-08-04 PROCEDURE — 3079F DIAST BP 80-89 MM HG: CPT | Mod: CPTII,S$GLB,, | Performed by: PODIATRIST

## 2022-08-04 PROCEDURE — 3074F SYST BP LT 130 MM HG: CPT | Mod: CPTII,S$GLB,, | Performed by: PODIATRIST

## 2022-08-04 RX ORDER — METHYLPREDNISOLONE 4 MG/1
TABLET ORAL
Qty: 1 EACH | Refills: 0 | Status: SHIPPED | OUTPATIENT
Start: 2022-08-04 | End: 2022-08-25

## 2022-08-15 ENCOUNTER — LAB VISIT (OUTPATIENT)
Dept: LAB | Facility: HOSPITAL | Age: 32
End: 2022-08-15
Attending: INTERNAL MEDICINE
Payer: COMMERCIAL

## 2022-08-15 ENCOUNTER — PATIENT MESSAGE (OUTPATIENT)
Dept: FAMILY MEDICINE | Facility: CLINIC | Age: 32
End: 2022-08-15
Payer: COMMERCIAL

## 2022-08-15 DIAGNOSIS — Z00.00 ANNUAL PHYSICAL EXAM: ICD-10-CM

## 2022-08-15 LAB
ALBUMIN SERPL BCP-MCNC: 3.5 G/DL (ref 3.5–5.2)
ALP SERPL-CCNC: 81 U/L (ref 55–135)
ALT SERPL W/O P-5'-P-CCNC: 13 U/L (ref 10–44)
ANION GAP SERPL CALC-SCNC: 8 MMOL/L (ref 8–16)
AST SERPL-CCNC: 14 U/L (ref 10–40)
BASOPHILS # BLD AUTO: 0.04 K/UL (ref 0–0.2)
BASOPHILS NFR BLD: 0.4 % (ref 0–1.9)
BILIRUB SERPL-MCNC: 0.8 MG/DL (ref 0.1–1)
BUN SERPL-MCNC: 12 MG/DL (ref 6–20)
CALCIUM SERPL-MCNC: 9.4 MG/DL (ref 8.7–10.5)
CHLORIDE SERPL-SCNC: 102 MMOL/L (ref 95–110)
CHOLEST SERPL-MCNC: 140 MG/DL (ref 120–199)
CHOLEST/HDLC SERPL: 2.1 {RATIO} (ref 2–5)
CO2 SERPL-SCNC: 27 MMOL/L (ref 23–29)
CREAT SERPL-MCNC: 0.8 MG/DL (ref 0.5–1.4)
DIFFERENTIAL METHOD: NORMAL
EOSINOPHIL # BLD AUTO: 0.1 K/UL (ref 0–0.5)
EOSINOPHIL NFR BLD: 0.7 % (ref 0–8)
ERYTHROCYTE [DISTWIDTH] IN BLOOD BY AUTOMATED COUNT: 14 % (ref 11.5–14.5)
EST. GFR  (NO RACE VARIABLE): >60 ML/MIN/1.73 M^2
ESTIMATED AVG GLUCOSE: 114 MG/DL (ref 68–131)
GLUCOSE SERPL-MCNC: 95 MG/DL (ref 70–110)
HBA1C MFR BLD: 5.6 % (ref 4–5.6)
HCT VFR BLD AUTO: 43.1 % (ref 40–54)
HDLC SERPL-MCNC: 67 MG/DL (ref 40–75)
HDLC SERPL: 47.9 % (ref 20–50)
HGB BLD-MCNC: 15 G/DL (ref 14–18)
IMM GRANULOCYTES # BLD AUTO: 0.02 K/UL (ref 0–0.04)
IMM GRANULOCYTES NFR BLD AUTO: 0.2 % (ref 0–0.5)
LDLC SERPL CALC-MCNC: 63.8 MG/DL (ref 63–159)
LYMPHOCYTES # BLD AUTO: 3.4 K/UL (ref 1–4.8)
LYMPHOCYTES NFR BLD: 35.7 % (ref 18–48)
MCH RBC QN AUTO: 30.5 PG (ref 27–31)
MCHC RBC AUTO-ENTMCNC: 34.8 G/DL (ref 32–36)
MCV RBC AUTO: 88 FL (ref 82–98)
MONOCYTES # BLD AUTO: 0.7 K/UL (ref 0.3–1)
MONOCYTES NFR BLD: 7.3 % (ref 4–15)
NEUTROPHILS # BLD AUTO: 5.3 K/UL (ref 1.8–7.7)
NEUTROPHILS NFR BLD: 55.7 % (ref 38–73)
NONHDLC SERPL-MCNC: 73 MG/DL
NRBC BLD-RTO: 0 /100 WBC
PLATELET # BLD AUTO: 243 K/UL (ref 150–450)
PMV BLD AUTO: 9.5 FL (ref 9.2–12.9)
POTASSIUM SERPL-SCNC: 3.7 MMOL/L (ref 3.5–5.1)
PROT SERPL-MCNC: 7.4 G/DL (ref 6–8.4)
RBC # BLD AUTO: 4.92 M/UL (ref 4.6–6.2)
SODIUM SERPL-SCNC: 137 MMOL/L (ref 136–145)
TRIGL SERPL-MCNC: 46 MG/DL (ref 30–150)
TSH SERPL DL<=0.005 MIU/L-ACNC: 1.26 UIU/ML (ref 0.4–4)
WBC # BLD AUTO: 9.44 K/UL (ref 3.9–12.7)

## 2022-08-15 PROCEDURE — 83036 HEMOGLOBIN GLYCOSYLATED A1C: CPT | Performed by: INTERNAL MEDICINE

## 2022-08-15 PROCEDURE — 80061 LIPID PANEL: CPT | Performed by: INTERNAL MEDICINE

## 2022-08-15 PROCEDURE — 85025 COMPLETE CBC W/AUTO DIFF WBC: CPT | Performed by: INTERNAL MEDICINE

## 2022-08-15 PROCEDURE — 84443 ASSAY THYROID STIM HORMONE: CPT | Performed by: INTERNAL MEDICINE

## 2022-08-15 PROCEDURE — 80053 COMPREHEN METABOLIC PANEL: CPT | Performed by: INTERNAL MEDICINE

## 2022-08-15 PROCEDURE — 36415 COLL VENOUS BLD VENIPUNCTURE: CPT | Performed by: INTERNAL MEDICINE

## 2022-09-27 NOTE — PROGRESS NOTES
Subjective:      Patient ID: Pasquale Birch is a 32 y.o. male.    Chief Complaint: Foot Problem and Foot Pain (Right foot pain)    Pt presents today c/o pain in right heel around the achilles tendon area. Pt denies any trauma. Pt states the pain is worse when standing/weight bearing.       Review of Systems   Constitutional: Negative for chills, fever and malaise/fatigue.   HENT:  Negative for hearing loss.    Cardiovascular:  Negative for claudication.   Respiratory:  Negative for shortness of breath.    Skin:  Negative for flushing and rash.   Musculoskeletal:  Negative for joint pain and myalgias.   Neurological:  Negative for loss of balance, numbness, paresthesias and sensory change.   Psychiatric/Behavioral:  Negative for altered mental status.          Objective:      Physical Exam  Vitals reviewed.   Cardiovascular:      Pulses:           Dorsalis pedis pulses are 2+ on the right side and 2+ on the left side.        Posterior tibial pulses are 2+ on the right side and 2+ on the left side.      Comments: No edema noted b/L  Musculoskeletal:      Comments: Decreased right ankle joint ROM, pain with palpation of posterior 1/3 calcaneus at region of Achilles tendon insertion. moderate pain with ankle joint ROM   No palpable dell noted         Feet:      Right foot:      Protective Sensation: 5 sites tested.  5 sites sensed.      Left foot:      Protective Sensation: 5 sites tested.  5 sites sensed.   Skin:     Capillary Refill: Capillary refill takes 2 to 3 seconds.      Comments: Normal skin tugor noted.   No open lesion noted b/L  Skin temp is warm to warm from proximal to distal b/L.  Webspaces clean, dry, and intact     Neurological:      Mental Status: He is alert and oriented to person, place, and time.      Comments: Gross sensation intact b/L             Assessment:       Encounter Diagnoses   Name Primary?    Foot pain, right Yes    Achilles tendinitis of right lower extremity          Plan:       Pasquale  was seen today for foot problem and foot pain.    Diagnoses and all orders for this visit:    Foot pain, right  -     Ambulatory referral/consult to Podiatry    Achilles tendinitis of right lower extremity    Other orders  -     methylPREDNISolone (MEDROL DOSEPACK) 4 mg tablet; use as directed      I counseled the patient on his conditions, their implications and medical management.        Pt was advised on the etiologies and issues associated with achilles tendinitis.  Medrol dose pack prescribed for inflammation, pt advised to use ice, elevation, and OTC NSAIDs for residual pain.   Heel cups dispensed to be worn in opposite shoe  Pt instructed to purchase OTC Voltaren gel 1%, use on affected area. Pt advised discontinue usage if any adverse effects should occur.   Shoe modification from steel toe boot to shoe  Call or return to clinic prn if these symptoms worsen or fail to improve as anticipated.  .

## 2022-11-29 ENCOUNTER — OFFICE VISIT (OUTPATIENT)
Dept: OTOLARYNGOLOGY | Facility: CLINIC | Age: 32
End: 2022-11-29
Payer: COMMERCIAL

## 2022-11-29 VITALS — WEIGHT: 173 LBS | BODY MASS INDEX: 28.79 KG/M2

## 2022-11-29 DIAGNOSIS — H92.01 EAR DISCOMFORT, RIGHT: ICD-10-CM

## 2022-11-29 DIAGNOSIS — H73.892 RETRACTION OF TYMPANIC MEMBRANE OF LEFT EAR: ICD-10-CM

## 2022-11-29 DIAGNOSIS — H92.11 OTORRHEA, RIGHT: Primary | ICD-10-CM

## 2022-11-29 DIAGNOSIS — H90.A11 CONDUCTIVE HEARING LOSS OF RIGHT EAR WITH RESTRICTED HEARING OF LEFT EAR: ICD-10-CM

## 2022-11-29 DIAGNOSIS — H73.891 RETRACTION POCKET OF TYMPANIC MEMBRANE, RIGHT: ICD-10-CM

## 2022-11-29 PROBLEM — H73.893: Status: ACTIVE | Noted: 2022-11-29

## 2022-11-29 PROCEDURE — 99999 PR PBB SHADOW E&M-EST. PATIENT-LVL III: ICD-10-PCS | Mod: PBBFAC,,, | Performed by: OTOLARYNGOLOGY

## 2022-11-29 PROCEDURE — 1159F PR MEDICATION LIST DOCUMENTED IN MEDICAL RECORD: ICD-10-PCS | Mod: CPTII,S$GLB,, | Performed by: OTOLARYNGOLOGY

## 2022-11-29 PROCEDURE — 3044F PR MOST RECENT HEMOGLOBIN A1C LEVEL <7.0%: ICD-10-PCS | Mod: CPTII,S$GLB,, | Performed by: OTOLARYNGOLOGY

## 2022-11-29 PROCEDURE — 3008F BODY MASS INDEX DOCD: CPT | Mod: CPTII,S$GLB,, | Performed by: OTOLARYNGOLOGY

## 2022-11-29 PROCEDURE — 99204 PR OFFICE/OUTPT VISIT, NEW, LEVL IV, 45-59 MIN: ICD-10-PCS | Mod: S$GLB,,, | Performed by: OTOLARYNGOLOGY

## 2022-11-29 PROCEDURE — 3044F HG A1C LEVEL LT 7.0%: CPT | Mod: CPTII,S$GLB,, | Performed by: OTOLARYNGOLOGY

## 2022-11-29 PROCEDURE — 99204 OFFICE O/P NEW MOD 45 MIN: CPT | Mod: S$GLB,,, | Performed by: OTOLARYNGOLOGY

## 2022-11-29 PROCEDURE — 3008F PR BODY MASS INDEX (BMI) DOCUMENTED: ICD-10-PCS | Mod: CPTII,S$GLB,, | Performed by: OTOLARYNGOLOGY

## 2022-11-29 PROCEDURE — 99999 PR PBB SHADOW E&M-EST. PATIENT-LVL III: CPT | Mod: PBBFAC,,, | Performed by: OTOLARYNGOLOGY

## 2022-11-29 PROCEDURE — 1159F MED LIST DOCD IN RCRD: CPT | Mod: CPTII,S$GLB,, | Performed by: OTOLARYNGOLOGY

## 2022-11-29 RX ORDER — CIPROFLOXACIN AND DEXAMETHASONE 3; 1 MG/ML; MG/ML
4 SUSPENSION/ DROPS AURICULAR (OTIC) 2 TIMES DAILY
Qty: 7.5 ML | Refills: 0 | Status: SHIPPED | OUTPATIENT
Start: 2022-11-29 | End: 2022-12-09

## 2022-11-29 NOTE — PROGRESS NOTES
Subjective:       Patient ID: Pasquale Birch is a 32 y.o. male.    Chief Complaint: Ear Drainage and Ear Fullness    Ear Drainage   Associated symptoms include ear discharge and hearing loss. Pertinent negatives include no abdominal pain, coughing, diarrhea, headaches, neck pain, rash or vomiting.   Ear Fullness   Associated symptoms include ear discharge and hearing loss. Pertinent negatives include no abdominal pain, coughing, diarrhea, headaches, neck pain, rash or vomiting.   Mr. Birch is here today in clinic with complaints of intermittent  right ear drainage which began a month ago. He reports using hydrogen peroxide in his right ear to remove wax. He reports he experienced right ear drainage on many occassions and has ear troubles since he was a child. He reports decrease in hearing right ear > left ear and occasional tinnitus. He denies any pain, fever, dizziness or previous ear surgeries.     No past medical history on file.  No past surgical history on file.  Family History   Problem Relation Age of Onset    Hypertension Mother     Diabetes Mother     No Known Problems Father     COPD Sister     No Known Problems Brother     Diabetes Maternal Grandmother     Diabetes Mellitus Maternal Grandmother     Hypertension Maternal Grandmother     No Known Problems Maternal Grandfather     Hyperlipidemia Paternal Grandmother     Hypertension Paternal Grandmother     Diabetes Paternal Grandmother      Social History  Social History     Tobacco Use    Smoking status: Never    Smokeless tobacco: Never   Substance Use Topics    Alcohol use: Not Currently       Review of Systems     Constitutional: Negative for appetite change, chills, fatigue, fever and unexpected weight loss.      HENT: Positive for ear discharge, ear infection, ear pain and hearing loss.      Eyes:  Negative for change in eyesight, eye drainage, eye itching and photophobia.     Respiratory:  Negative for cough, shortness of breath, sleep  apnea, snoring and wheezing.      Cardiovascular:  Negative for chest pain, foot swelling, irregular heartbeat and swollen veins.     Gastrointestinal:  Negative for abdominal pain, acid reflux, constipation, diarrhea, heartburn and vomiting.     Genitourinary: Negative for difficulty urinating, sexual problems and frequent urination.     Musc: Negative for aching joints, aching muscles, back pain and neck pain.     Skin: Negative for rash.     Allergy: Negative for food allergies and seasonal allergies.     Endocrine: Negative for cold intolerance and heat intolerance.      Neurological: Negative for dizziness, headaches, light-headedness, seizures and tremors.      Hematologic: Negative for bruises/bleeds easily and swollen glands.      Psychiatric: Negative for decreased concentration, depression, nervous/anxious and sleep disturbance.              Objective:      Physical Exam  Constitutional:       Appearance: Normal appearance. He is normal weight.   HENT:      Head: Normocephalic.      Right Ear: Decreased hearing noted. Drainage present. No tenderness. Tympanic membrane is retracted. Tympanic membrane has decreased mobility.      Left Ear: Decreased hearing noted. Drainage present. No tenderness. Tympanic membrane is retracted. Tympanic membrane has decreased mobility.      Ears:      Olivares exam findings: Lateralizes right.     Right Rinne: BC > AC.     Left Rinne: BC > AC.     Nose: No congestion or rhinorrhea.   Eyes:      Pupils: Pupils are equal, round, and reactive to light.   Musculoskeletal:      Cervical back: Normal range of motion.   Neurological:      Mental Status: He is alert.       Assessment:       1. Otorrhea, right    2. Ear discomfort, right    3. Conductive hearing loss of right ear with restricted hearing of left ear    4. Retracted tympanic membrane, bilateral    5. Retraction pocket of tympanic membrane, right    6. Retraction of tympanic membrane, bilateral          Plan:       -  followup as needed if symptom worsen  - schedule CT scan and audiogram  - Ciprodex gtts twice a day

## 2023-02-03 ENCOUNTER — CLINICAL SUPPORT (OUTPATIENT)
Dept: AUDIOLOGY | Facility: CLINIC | Age: 33
End: 2023-02-03
Payer: COMMERCIAL

## 2023-02-03 ENCOUNTER — OFFICE VISIT (OUTPATIENT)
Dept: OTOLARYNGOLOGY | Facility: CLINIC | Age: 33
End: 2023-02-03
Payer: COMMERCIAL

## 2023-02-03 DIAGNOSIS — H93.12 TINNITUS, LEFT EAR: ICD-10-CM

## 2023-02-03 DIAGNOSIS — H90.A11 CONDUCTIVE HEARING LOSS OF RIGHT EAR WITH RESTRICTED HEARING OF LEFT EAR: Primary | ICD-10-CM

## 2023-02-03 DIAGNOSIS — H73.891 RETRACTION POCKET OF TYMPANIC MEMBRANE, RIGHT: ICD-10-CM

## 2023-02-03 DIAGNOSIS — H92.01 EAR DISCOMFORT, RIGHT: ICD-10-CM

## 2023-02-03 DIAGNOSIS — H73.892 RETRACTION OF TYMPANIC MEMBRANE OF LEFT EAR: ICD-10-CM

## 2023-02-03 DIAGNOSIS — H90.11 CONDUCTIVE HEARING LOSS OF RIGHT EAR WITH UNRESTRICTED HEARING OF LEFT EAR: Primary | ICD-10-CM

## 2023-02-03 PROCEDURE — 92567 PR TYMPA2METRY: ICD-10-PCS | Mod: S$GLB,,,

## 2023-02-03 PROCEDURE — 1159F PR MEDICATION LIST DOCUMENTED IN MEDICAL RECORD: ICD-10-PCS | Mod: CPTII,S$GLB,, | Performed by: OTOLARYNGOLOGY

## 2023-02-03 PROCEDURE — 92567 TYMPANOMETRY: CPT | Mod: S$GLB,,,

## 2023-02-03 PROCEDURE — 99214 OFFICE O/P EST MOD 30 MIN: CPT | Mod: S$GLB,,, | Performed by: OTOLARYNGOLOGY

## 2023-02-03 PROCEDURE — 99214 PR OFFICE/OUTPT VISIT, EST, LEVL IV, 30-39 MIN: ICD-10-PCS | Mod: S$GLB,,, | Performed by: OTOLARYNGOLOGY

## 2023-02-03 PROCEDURE — 99999 PR PBB SHADOW E&M-EST. PATIENT-LVL I: ICD-10-PCS | Mod: PBBFAC,,,

## 2023-02-03 PROCEDURE — 92557 COMPREHENSIVE HEARING TEST: CPT | Mod: S$GLB,,,

## 2023-02-03 PROCEDURE — 99999 PR PBB SHADOW E&M-EST. PATIENT-LVL II: CPT | Mod: PBBFAC,,, | Performed by: OTOLARYNGOLOGY

## 2023-02-03 PROCEDURE — 92557 PR COMPREHENSIVE HEARING TEST: ICD-10-PCS | Mod: S$GLB,,,

## 2023-02-03 PROCEDURE — 99999 PR PBB SHADOW E&M-EST. PATIENT-LVL I: CPT | Mod: PBBFAC,,,

## 2023-02-03 PROCEDURE — 1159F MED LIST DOCD IN RCRD: CPT | Mod: CPTII,S$GLB,, | Performed by: OTOLARYNGOLOGY

## 2023-02-03 PROCEDURE — 99999 PR PBB SHADOW E&M-EST. PATIENT-LVL II: ICD-10-PCS | Mod: PBBFAC,,, | Performed by: OTOLARYNGOLOGY

## 2023-02-03 NOTE — PROGRESS NOTES
Subjective:       Patient ID: Pasquale Birch is a 32 y.o. male.    Chief Complaint: Follow-up    Follow-up  Associated symptoms include chest pain and coughing.      Pasquale Birch is a 32 y.o. male with bilateral chronic adhesive middle ear disease seen for follow-up.  He reports his right ear infection resolved with topical antibiotic treatment.  This has been a recurrent issue for him.  He did not complete a CT scan.  Reports right greater than left hearing loss    Review of Systems   Constitutional: Negative.    HENT:  Positive for ear pain, hearing loss and postnasal drip.    Eyes: Negative.    Respiratory:  Positive for cough.    Cardiovascular:  Positive for chest pain.   Gastrointestinal: Negative.    Endocrine: Negative.    Genitourinary: Negative.    Integumentary:  Negative.   Allergic/Immunologic: Negative.    Neurological: Negative.    Hematological: Negative.    Psychiatric/Behavioral: Negative.         Objective:      Physical Exam  Vitals and nursing note reviewed.   Constitutional:       Appearance: Normal appearance.   HENT:      Head: Normocephalic and atraumatic.      Right Ear: External ear normal. There is no impacted cerumen.      Left Ear: External ear normal. There is no impacted cerumen.   Neurological:      Mental Status: He is alert.       Binocular Microscopy  Indications:  Chronic otitis media  Details: binocular microscopy used to examine both ears  Findings  AD:  Severe retraction of tympanic membrane with adhesion to middle ear, incus absent, adhesions to stapes, unable to see depth of retraction pocket  AS:  Moderately severe retraction of tympanic membrane with erosion of lenticular process of incus adhesion to stapes.  Not as severe as right side.  Depth of pocket visible.    Data Reviewed:      Audiogram independently reviewed by me shows right-sided mild conductive hearing loss l    Assessment:       Problem List Items Addressed This Visit    None  Visit Diagnoses       Conductive  hearing loss of right ear with restricted hearing of left ear    -  Primary    Ear discomfort, right        Retraction pocket of tympanic membrane, right        Retraction of tympanic membrane of left ear                  Plan:     In summary is a pleasant 32-year-old with bilateral chronic otitis media with adhesive middle ear disease, right side there is some concern of cholesteatoma formation.      Recommend CT temporal bone will discuss elective nature of tympanomastoidectomy pending CT scan findings.

## 2023-02-03 NOTE — PROGRESS NOTES
Pasquale Birch, a 32 y.o. male, was seen today in the clinic for an audiologic evaluation.  The patient's main complaint was hearing loss in the right ear.  Mr. Birch reported non-bothersome tinnitus in his left ear. He denied otalgia, aural fullness and vertigo.     Tympanometry revealed Type As in the right ear and Type As in the left ear. Audiogram results revealed a mild conductive hearing loss in the right ear and normal hearing sensitivity in the left ear.  Speech reception thresholds were noted at 30 dB in the right ear and 20 dB in the left ear.  Speech discrimination scores were 100% in the right ear and 100% in the left ear.    Recommendations:  Otologic evaluation  Repeat audiogram at ENT follow-up  Hearing protection when in noise

## 2023-02-10 ENCOUNTER — HOSPITAL ENCOUNTER (OUTPATIENT)
Dept: RADIOLOGY | Facility: HOSPITAL | Age: 33
Discharge: HOME | End: 2023-02-10
Attending: OTOLARYNGOLOGY
Payer: COMMERCIAL

## 2023-02-10 ENCOUNTER — PATIENT MESSAGE (OUTPATIENT)
Dept: OTOLARYNGOLOGY | Facility: CLINIC | Age: 33
End: 2023-02-10
Payer: COMMERCIAL

## 2023-02-10 DIAGNOSIS — H90.A11 CONDUCTIVE HEARING LOSS OF RIGHT EAR WITH RESTRICTED HEARING OF LEFT EAR: ICD-10-CM

## 2023-02-10 PROCEDURE — 70480 CT TEMPORAL BONE WITHOUT CONTRAST: ICD-10-PCS | Mod: 26,,, | Performed by: RADIOLOGY

## 2023-02-10 PROCEDURE — 70480 CT ORBIT/EAR/FOSSA W/O DYE: CPT | Mod: 26,,, | Performed by: RADIOLOGY

## 2023-02-10 PROCEDURE — 70480 CT ORBIT/EAR/FOSSA W/O DYE: CPT | Mod: TC

## 2023-02-10 NOTE — TELEPHONE ENCOUNTER
Reviewed CT with patient, no signs of cholesteatoma. There are chronic changes and incus erosion.     Discussed elective tymp/OCR and instructed patient to call if he would like to proceed.

## 2024-02-26 ENCOUNTER — PATIENT OUTREACH (OUTPATIENT)
Dept: ADMINISTRATIVE | Facility: HOSPITAL | Age: 34
End: 2024-02-26
Payer: COMMERCIAL

## 2024-02-26 NOTE — PROGRESS NOTES
Population Health Chart Review & Patient Outreach Details    Outreach Performed: YES Telephone Not Successful    Additional [facility] Health Notes:    Telephone outreach to update PCP. VM full, unable to lm.       Updates Requested / Reviewed:      Updated Care Coordination Note, Care Everywhere, and Immunizations Reconciliation Completed or Queried: [place]         Health Maintenance Topics Overdue:    Health Maintenance Due   Topic Date Due    Influenza Vaccine (1) 09/01/2023    COVID-19 Vaccine (3 - 2023-24 season) 09/01/2023         Health Maintenance Topic(s) Outreach Outcomes & Actions Taken:

## 2024-08-13 NOTE — PROGRESS NOTES
[facility] - New Patient Evaluation    Referred by: Aaareferral Self   Reason for referral: Lumbar radiculopathy, chronic     CC:   Chief Complaint   Patient presents with    Leg Pain     Rt     Foot Pain     Rt           No data to display                Subjective 08/13/2024:   Pasquale Birch is a 34 y.o. male who presents complaining of right leg and right foot pain.  Patient reports a football injury 7 years ago he reports falling multiple times and soon after experiencing right low back right buttocks and right leg pain that started intermittently but has increased to continued pain that is disrupting his quality of life and stopping him from performing his work duties as a .    Initial Pain Assessment:  Location: right leg and foot pain    Onset: 5 years  Current Pain Score: 8/10  Daily Pain of Range: 7-10/10  Quality: Aching, Burning, Throbbing, Grabbing, Tight, Tingling, Deep, and Hot  Radiation: right foot   Worsened by: exercise, lifting, lying down, sitting, standing for more than several minutes, walking for more than several minutes, and Daily activity   Improved by: nothing     Patient denies night fever/night sweats, urinary incontinence, bowel incontinence, significant weight loss, significant motor weakness, and loss of sensations.      Previous Interventions:  - n/a    Previous Therapies:  PT/OT: no   Chiropractor:  Yes which helped in the past  HEP:  Patient has been participating in a home exercise plan greater than 10-12 weeks that involves stretching, muscular strengthening none of which has helped reduce his symptoms.  He reports performing exercises given to him by his chiropractor.  Relevant Surgery: no     Previous Medications:   - Tylenol or NSAIDS: ibuprofen 800 mg BID Mobic 15 mg  - Muscle Relaxants: Flexeril 5 mg    - TCAs:   - SNRIs:   - Topicals:   - Anticonvulsants:    - Opioids:   - Adjuvants:     Current Pain Medications:  N/A     Review of  Systems:  ROS    GENERAL:  No weight loss, malaise or fevers.  HEENT:   No recent changes in vision or hearing  NECK:  No difficulty with swallowing. No stridor.   RESPIRATORY:  Negative for cough, wheezing or shortness of breath, patient denies any recent URI.  CARDIOVASCULAR:  Negative for chest pain, leg swelling or palpitations.  GI:  Negative for abdominal discomfort, blood in stools or black stools or change in bowel habits.  MUSCULOSKELETAL:  See HPI.  SKIN:  Negative for lesions, rash, and itching.  PSYCH:  No mood disorder or recent psychosocial stressors.    HEMATOLOGY/LYMPHOLOGY:  Negative for prolonged bleeding, bruising easily or swollen nodes.  Patient is not currently taking any anti-coagulants  NEURO:   No history of headaches, syncope, paralysis, seizures or tremors.  All other reviewed and negative other than HPI.    History:  Current medications, allergies, medical history, surgical history,   family history, and social history were reviewed in the chart as marked.    Full Medication List:    Current Outpatient Medications:     fluticasone propionate (FLONASE) 50 mcg/actuation nasal spray, 2 sprays (100 mcg total) by Each Nostril route once daily., Disp: 16 g, Rfl: 2     Allergies:  Patient has no known allergies.     Medical History:   has no past medical history on file.    Surgical History:   has no past surgical history on file.    Family History:  family history includes COPD in his sister; Diabetes in his maternal grandmother, mother, and paternal grandmother; Diabetes Mellitus in his maternal grandmother; Hyperlipidemia in his paternal grandmother; Hypertension in his maternal grandmother, mother, and paternal grandmother; No Known Problems in his brother, father, and maternal grandfather.    Social History:   reports that he has never smoked. He has never used smokeless tobacco. He reports that he does not currently use alcohol.    Physical Exam:  Vitals:    08/14/24 0837   BP: 130/80  "  Pulse: (!) 55   Weight: 88.2 kg (194 lb 5.4 oz)   Height: 5' 5" (1.651 m)   PainSc:   8   PainLoc: Leg       GENERAL: Well appearing, in no acute distress, alert and oriented x3.  PSYCH:  Mood and affect appropriate.  SKIN: Skin color, texture, turgor normal, no rashes or lesions.  HEAD/FACE:  Normocephalic, atraumatic. Cranial nerves grossly intact.  NECK: Normal ROM. Supple.  No pain to palpation over the cervical paraspinous muscles. Spurling Negative. No pain with neck flexion, extension, or lateral rotation.   CV: RRR with palpation of the radial artery.  PULM: No evidence of respiratory difficulty, symmetric chest rise.  GI:  Soft and non-distended.  MSK: Straight leg raising is positive  to radicular pain RIGHT only.  No pain to palpation over the facet joints of the lumbar spine. No pain with lumbar facet loading. No pain over the SI joints. Sacral Thrust is negative. SAMINA test is negative.  Gaenslen Test is negative . No pain over the GBT bilaterally.  Moderate range of motion of the lumbar spine with with pain reproduction. + pain in the right buttock with palpation.  Peripheral joint ROM is full and pain free without obvious instability or laxity in all four extremities. No obvious deformities, edema, or skin discoloration.  No atrophy or tone abnormalities are noted.   NEURO: Bilateral upper and lower extremity coordination and strength is symmetric.  No loss of sensation is noted.  MENTAL STATUS: A x O x 3, good concentration, speech is fluent and goal directed  MOTOR: 5/5 in all muscle groups  GAIT: Normal. Ambulates unassisted.    Imaging:  None     Labs:  BMP  Lab Results   Component Value Date     08/15/2022    K 3.7 08/15/2022     08/15/2022    CO2 27 08/15/2022    BUN 12 08/15/2022    CREATININE 0.8 08/15/2022    CALCIUM 9.4 08/15/2022    ANIONGAP 8 08/15/2022    EGFRNORACEVR >60.0 08/15/2022     Lab Results   Component Value Date    ALT 13 08/15/2022    AST 14 08/15/2022    ALKPHOS " 81 08/15/2022    BILITOT 0.8 08/15/2022     Lab Results   Component Value Date    WBC 9.44 08/15/2022    HGB 15.0 08/15/2022    HCT 43.1 08/15/2022    MCV 88 08/15/2022     08/15/2022           Assessment:  Problem List Items Addressed This Visit    None  Visit Diagnoses       Lumbar radiculopathy, chronic    -  Primary    Relevant Medications    meloxicam (MOBIC) 15 MG tablet    Other Relevant Orders    X-Ray Lumbar Spine AP And Lateral    MRI Lumbar Spine Without Contrast    DDD (degenerative disc disease), lumbar        Relevant Medications    meloxicam (MOBIC) 15 MG tablet    Other Relevant Orders    X-Ray Lumbar Spine AP And Lateral    MRI Lumbar Spine Without Contrast    Spinal stenosis of lumbar region with neurogenic claudication                  08/13/2024 - Pasquale Birch is a 34 y.o. male who  has no past medical history on file.  By history and examination this patient has chronic RIGHT low back and buttock pain  with RIGHT radiculopathy.  The underlying cause cause is degenerative disc disease, foraminal stenosis, and central canal stenosis.  Pathology is confirmed by imaging.  We discussed the underlying diagnoses and multiple treatment options including non-opioid medications, interventional procedures, physical therapy, home exercise, core muscle enhancement, activity modification, and weight loss.  The risks and benefits of each treatment option were discussed and all questions were answered.      Treatment Plan:   Procedures:  Consider right TF TEQUILA targeting L4-5 pending review a lumbar MRI.  PT/OT/HEP: I have stressed the importance of physical activity and a home exercise plan to help with pain and improve health.  Medications:  New prescription of Mobic 15 mg daily p.r.n.                         Start Tylenol a 1000 mg t.i.d. p.r.n. not to exceed 3000 mg in 24 hr period   reviewed and medications are appropriately dosed for current hepatorenal function.   -  Not applicable  Imaging:   Lumbar x-ray AP lateral views, lumbar MRI today    Follow Up: Virtually p Lumbar MRI      Noel Alonzo, NP-C  Interventional Pain Management    Disclaimer: This note was partly generated using dictation software which may occasionally result in transcription errors.

## 2024-08-14 ENCOUNTER — OFFICE VISIT (OUTPATIENT)
Dept: PAIN MEDICINE | Facility: CLINIC | Age: 34
End: 2024-08-14
Payer: COMMERCIAL

## 2024-08-14 ENCOUNTER — PATIENT MESSAGE (OUTPATIENT)
Dept: PAIN MEDICINE | Facility: CLINIC | Age: 34
End: 2024-08-14

## 2024-08-14 ENCOUNTER — HOSPITAL ENCOUNTER (OUTPATIENT)
Dept: RADIOLOGY | Facility: HOSPITAL | Age: 34
Discharge: HOME | End: 2024-08-14
Attending: NURSE PRACTITIONER
Payer: COMMERCIAL

## 2024-08-14 VITALS
HEIGHT: 65 IN | DIASTOLIC BLOOD PRESSURE: 80 MMHG | BODY MASS INDEX: 32.37 KG/M2 | SYSTOLIC BLOOD PRESSURE: 130 MMHG | WEIGHT: 194.31 LBS | HEART RATE: 55 BPM

## 2024-08-14 DIAGNOSIS — M54.16 LUMBAR RADICULOPATHY, CHRONIC: ICD-10-CM

## 2024-08-14 DIAGNOSIS — M54.16 LUMBAR RADICULOPATHY, CHRONIC: Primary | ICD-10-CM

## 2024-08-14 DIAGNOSIS — M51.36 DDD (DEGENERATIVE DISC DISEASE), LUMBAR: ICD-10-CM

## 2024-08-14 DIAGNOSIS — M48.062 SPINAL STENOSIS OF LUMBAR REGION WITH NEUROGENIC CLAUDICATION: ICD-10-CM

## 2024-08-14 PROCEDURE — 3075F SYST BP GE 130 - 139MM HG: CPT | Mod: CPTII,S$GLB,, | Performed by: NURSE PRACTITIONER

## 2024-08-14 PROCEDURE — 72100 X-RAY EXAM L-S SPINE 2/3 VWS: CPT | Mod: TC,FY

## 2024-08-14 PROCEDURE — 3079F DIAST BP 80-89 MM HG: CPT | Mod: CPTII,S$GLB,, | Performed by: NURSE PRACTITIONER

## 2024-08-14 PROCEDURE — 3008F BODY MASS INDEX DOCD: CPT | Mod: CPTII,S$GLB,, | Performed by: NURSE PRACTITIONER

## 2024-08-14 PROCEDURE — 72100 X-RAY EXAM L-S SPINE 2/3 VWS: CPT | Mod: 26,,, | Performed by: RADIOLOGY

## 2024-08-14 PROCEDURE — 99204 OFFICE O/P NEW MOD 45 MIN: CPT | Mod: S$GLB,,, | Performed by: NURSE PRACTITIONER

## 2024-08-14 PROCEDURE — 1160F RVW MEDS BY RX/DR IN RCRD: CPT | Mod: CPTII,S$GLB,, | Performed by: NURSE PRACTITIONER

## 2024-08-14 PROCEDURE — 1159F MED LIST DOCD IN RCRD: CPT | Mod: CPTII,S$GLB,, | Performed by: NURSE PRACTITIONER

## 2024-08-14 PROCEDURE — 99999 PR PBB SHADOW E&M-EST. PATIENT-LVL III: CPT | Mod: PBBFAC,,, | Performed by: NURSE PRACTITIONER

## 2024-08-14 RX ORDER — MELOXICAM 15 MG/1
15 TABLET ORAL DAILY
Qty: 30 TABLET | Refills: 0 | Status: SHIPPED | OUTPATIENT
Start: 2024-08-14 | End: 2024-09-14

## 2024-08-14 RX ORDER — MELOXICAM 15 MG/1
15 TABLET ORAL DAILY
Qty: 30 TABLET | Refills: 0 | Status: SHIPPED | OUTPATIENT
Start: 2024-08-14 | End: 2024-08-14 | Stop reason: SDUPTHER

## 2024-08-21 ENCOUNTER — HOSPITAL ENCOUNTER (OUTPATIENT)
Dept: RADIOLOGY | Facility: HOSPITAL | Age: 34
Discharge: HOME | End: 2024-08-21
Attending: NURSE PRACTITIONER
Payer: COMMERCIAL

## 2024-08-21 DIAGNOSIS — M54.16 LUMBAR RADICULOPATHY, CHRONIC: ICD-10-CM

## 2024-08-21 DIAGNOSIS — M51.36 DDD (DEGENERATIVE DISC DISEASE), LUMBAR: ICD-10-CM

## 2024-08-21 PROCEDURE — 72148 MRI LUMBAR SPINE W/O DYE: CPT | Mod: TC

## 2024-08-21 PROCEDURE — 72148 MRI LUMBAR SPINE W/O DYE: CPT | Mod: 26,,, | Performed by: RADIOLOGY

## 2024-08-23 ENCOUNTER — OFFICE VISIT (OUTPATIENT)
Dept: PAIN MEDICINE | Facility: CLINIC | Age: 34
End: 2024-08-23
Payer: COMMERCIAL

## 2024-08-23 ENCOUNTER — TELEPHONE (OUTPATIENT)
Dept: PAIN MEDICINE | Facility: CLINIC | Age: 34
End: 2024-08-23
Payer: COMMERCIAL

## 2024-08-23 DIAGNOSIS — M54.16 LUMBAR RADICULOPATHY, CHRONIC: ICD-10-CM

## 2024-08-23 DIAGNOSIS — G57.01 PIRIFORMIS SYNDROME, RIGHT: Primary | ICD-10-CM

## 2024-08-23 DIAGNOSIS — M51.36 DDD (DEGENERATIVE DISC DISEASE), LUMBAR: ICD-10-CM

## 2024-08-23 NOTE — TELEPHONE ENCOUNTER
----- Message from MATT Peters sent at 8/23/2024  2:05 PM CDT -----  Regarding: vijaya byrd  Please pend oxycodone 5 mg t.i.d. 90 tablets due on 08/26/24 3 month panel     Thanks

## 2024-08-23 NOTE — TELEPHONE ENCOUNTER
----- Message from MATT Peters sent at 2024  2:46 PM CDT -----  Regarding: Order for VILMA FRYE    Patient Name: VILMA FRYE([id number])  Sex: Male  : 1990      PCP: FRANDY, PRIMARY DOCTOR    Center: None     Types of orders made on 2024: Procedure Request    Order Date:2024  Ordering User:TIFFANIE DOS SANTOS [[id number]  Encounter Provider:Tiffanie Dos Santos FNP [[id number]]  Authorizing Provider: Tiffanie Dos Santos FNP [[id number]  Supervising Provider:MONTANA PETERS [[id number]]  Type of Supervision:Collaborating Physician  Department:[facility] PAIN MANAGEMENT[[id number]]    Common Order Information  Procedure -> Other (Specify location and laterality) Cmt: Right piriformis    Pre-op Diagnosis -> Piriformis syndrome, right     Order Specific Information  Order: Procedure Request Order for Pain Management [Custom: [id number]]  Order #:          [id number]  [id number]: 1 FUTURE    Priority: Routine  Class: Clinic Performed    Future Order Information      Expires on:2025            Expected by:2024                   Associated Diagnoses      G57.01 Piriformis syndrome, right      M54.16 Lumbar radiculopathy, chronic      M51.36 DDD (degenerative disc disease), lumbar      Physician -> Gelter         Is patient on anti-coagulants? -> No         Facility Na  me: -> [facility]         Follow-up: -> 2 weeks           Priority: Routine  Class: Clinic Performed    Future Order Information      Expires on:2025            Expected by:2024                   Associated Diagnoses      G57.01 Piriformis syndrome, right      M54.16 Lumbar radiculopathy, chronic      M51.36 DDD (degenerative disc disease), lumbar      Procedure -> Other (Specify location and laterality)   Cmt: Right piriformis        Physician -> Gelter         Is patient on anti-coagulants? -> No         Pre-op Diagnosis -> Piriformis syndrome, right         Facility Name: -> [facility]         Follow-up: -> 2 weeks

## 2024-08-23 NOTE — H&P (VIEW-ONLY)
[facility]  Patient Evaluation  telemedicine Encounter    Telemedicine Bundle:  This visit was completed during the Coronavirus Crisis to enhance patient safety.  The patient location is: patient's home  The chief complaint leading to consultation is: Low-back Pain and Leg Pain (Right buttock and posterior leg to foot )  Visit type: Virtual visit with synchronous audio and video  Total time spent with patient: 20 minutes  Each patient to whom he or she provides medical services by telemedicine is:    (1) informed of the relationship between the physician and patient and the respective role of any other health care provider with respect to management of the patient  (2) notified that he or she may decline to receive medical services by telemedicine and may withdraw from such care at any time.      Referred by: No ref. provider found   Reason for referral: * No diagnoses found *     CC:   Chief Complaint   Patient presents with    Low-back Pain    Leg Pain     Right buttock and posterior leg to foot          8/14/2024     8:38 AM   Last 3 PDI Scores   Pain Disability Index (PDI) 50       Interval history 8/23/2024:  34-year-old male that presents today virtually for a follow-up appointment to discuss his recent lumbar MRI.  The patient works as a  and he is continuing to suffer from chronic right low back right buttocks right posterior leg pain that radiates to his right foot.  His original injury occurred 7 years ago playing football he has attempted physical therapy for greater than 6 weeks he has been on multiple medications that have not helped sustain any type of relief.  The current pain is disrupting his quality of life he continues to report disruption in sleep his pain score today is 8/10.      Subjective 08/13/2024:   Pasquale Birch is a 34 y.o. male who presents complaining of right leg and right foot pain.  Patient reports a football injury 7 years  ago he reports falling multiple times and soon after experiencing right low back right buttocks and right leg pain that started intermittently but has increased to continued pain that is disrupting his quality of life and stopping him from performing his work duties as a .    Initial Pain Assessment:  Location: right leg and foot pain    Onset: 5 years  Current Pain Score: 8/10  Daily Pain of Range: 7-10/10  Quality: Aching, Burning, Throbbing, Grabbing, Tight, Tingling, Deep, and Hot  Radiation: right foot   Worsened by: exercise, lifting, lying down, sitting, standing for more than several minutes, walking for more than several minutes, and Daily activity   Improved by: nothing     Patient denies night fever/night sweats, urinary incontinence, bowel incontinence, significant weight loss, significant motor weakness, and loss of sensations.      Previous Interventions:  - n/a    Previous Therapies:  PT/OT:  Yes greater than 6 weeks with no relief  Chiropractor:  Yes which helped in the past  HEP:  Patient has been participating in a home exercise plan greater than 10-12 weeks that involves stretching, muscular strengthening none of which has helped reduce his symptoms.  He reports performing exercises given to him by his chiropractor.  Relevant Surgery: no     Previous Medications:   - Tylenol or NSAIDS: ibuprofen 800 mg BID Mobic 15 mg failed  - Muscle Relaxants: Flexeril 5 mg    - TCAs:   - SNRIs:   - Topicals:   - Anticonvulsants:  Gabapentin failed  - Opioids:   - Adjuvants:     Current Pain Medications:  Tylenol over-the-counter    Review of Systems:  Review of Systems   Musculoskeletal:  Positive for back pain.       GENERAL:  No weight loss, malaise or fevers.  HEENT:   No recent changes in vision or hearing  NECK:  No difficulty with swallowing. No stridor.   RESPIRATORY:  Negative for cough, wheezing or shortness of breath, patient denies any recent URI.  CARDIOVASCULAR:  Negative for chest  pain, leg swelling or palpitations.  GI:  Negative for abdominal discomfort, blood in stools or black stools or change in bowel habits.  MUSCULOSKELETAL:  See HPI.  SKIN:  Negative for lesions, rash, and itching.  PSYCH:  No mood disorder or recent psychosocial stressors.    HEMATOLOGY/LYMPHOLOGY:  Negative for prolonged bleeding, bruising easily or swollen nodes.  Patient is not currently taking any anti-coagulants  NEURO:   No history of headaches, syncope, paralysis, seizures or tremors.  All other reviewed and negative other than HPI.    History:  Current medications, allergies, medical history, surgical history,   family history, and social history were reviewed in the chart as marked.    Full Medication List:    Current Outpatient Medications:     fluticasone propionate (FLONASE) 50 mcg/actuation nasal spray, 2 sprays (100 mcg total) by Each Nostril route once daily., Disp: 16 g, Rfl: 2    meloxicam (MOBIC) 15 MG tablet, Take 1 tablet (15 mg total) by mouth once daily., Disp: 30 tablet, Rfl: 0     Allergies:  Patient has no known allergies.     Medical History:   has no past medical history on file.    Surgical History:   has no past surgical history on file.    Family History:  family history includes COPD in his sister; Diabetes in his maternal grandmother, mother, and paternal grandmother; Diabetes Mellitus in his maternal grandmother; Hyperlipidemia in his paternal grandmother; Hypertension in his maternal grandmother, mother, and paternal grandmother; No Known Problems in his brother, father, and maternal grandfather.    Social History:   reports that he has never smoked. He has never used smokeless tobacco. He reports that he does not currently use alcohol.    Physical Exam:  There were no vitals filed for this visit.  GEN: No acute distress. Calm, comfortable  HENT: Normocephalic, atraumatic, moist mucous membranes  EYE: Anicteric sclera, non-injected.   CV: Non-diaphoretic.   RESP: Breathing  comfortably. Chest expansion symmetric.  PSYCH: Pleasant mood and appropriate affect. Recent and remote memory intact.       GENERAL: Well appearing, in no acute distress, alert and oriented x3.  PSYCH:  Mood and affect appropriate.  SKIN: Skin color, texture, turgor normal, no rashes or lesions.  HEAD/FACE:  Normocephalic, atraumatic. Cranial nerves grossly intact.  NECK: Normal ROM. Supple.  No pain to palpation over the cervical paraspinous muscles. Spurling Negative. No pain with neck flexion, extension, or lateral rotation.   CV: RRR with palpation of the radial artery.  PULM: No evidence of respiratory difficulty, symmetric chest rise.  GI:  Soft and non-distended.  MSK: Straight leg raising is positive  to radicular pain RIGHT only.  Positive pain to palpation over the facet joints of the lumbar spine. No pain with lumbar facet loading. No pain over the SI joints. Sacral Thrust is negative. SAMINA test is negative.  Gaenslen Test is negative . No pain over the GBT bilaterally.  Moderate range of motion of the lumbar spine with with pain reproduction. + pain in the right buttock with palpation.  + Fair test, Peripheral joint ROM is full and pain free without obvious instability or laxity in all four extremities. No obvious deformities, edema, or skin discoloration.  No atrophy or tone abnormalities are noted.   NEURO: Bilateral upper and lower extremity coordination and strength is symmetric.  No loss of sensation is noted.  MENTAL STATUS: A x O x 3, good concentration, speech is fluent and goal directed  MOTOR: 5/5 in all muscle groups  GAIT: Normal. Ambulates unassisted.    Imaging:  EXAMINATION:  MRI LUMBAR SPINE WITHOUT CONTRAST     CLINICAL HISTORY:  Lumbar radiculopathy, symptoms persist with conservative treatment; Radiculopathy, lumbar region     TECHNIQUE:  Multiplanar, multisequence MR images were acquired from the thoracolumbar junction to the sacrum without the administration of contrast.      COMPARISON:  Plain film from 08/14/2024     FINDINGS:  Alignment: Normal.     Vertebrae: Normal marrow signal. No fracture.     Discs: Normal height and signal.     Cord: Normal.  Conus terminates at L1-2     Degenerative findings:     T12-L1: No focal disc bulge, spinal canal stenosis, or neural foraminal narrowing.     L1-L2: No focal disc bulge, spinal canal stenosis, or neural foraminal narrowing.     L2-L3: No focal disc bulge, spinal canal stenosis, or neural foraminal narrowing.     L3-L4: No focal disc bulge, spinal canal stenosis, or neural foraminal narrowing.     L4-L5: No focal disc bulge or spinal canal stenosis.  Minimal left-sided neural foraminal narrowing.  No right-sided neural foraminal narrowing.     L5-S1: Minimal diffuse broad-based disc bulge.  No central spinal canal stenosis or neural foraminal narrowing.     Paraspinal muscles & soft tissues: Unremarkable.     Impression:     Minimal degenerative changes of the lumbar spine, most prominent at L4-5 with minimal left-sided neural foraminal narrowing.    Labs:  BMP  Lab Results   Component Value Date     08/15/2022    K 3.7 08/15/2022     08/15/2022    CO2 27 08/15/2022    BUN 12 08/15/2022    CREATININE 0.8 08/15/2022    CALCIUM 9.4 08/15/2022    ANIONGAP 8 08/15/2022    EGFRNORACEVR >60.0 08/15/2022     Lab Results   Component Value Date    ALT 13 08/15/2022    AST 14 08/15/2022    ALKPHOS 81 08/15/2022    BILITOT 0.8 08/15/2022     Lab Results   Component Value Date    WBC 9.44 08/15/2022    HGB 15.0 08/15/2022    HCT 43.1 08/15/2022    MCV 88 08/15/2022     08/15/2022           Assessment:  Problem List Items Addressed This Visit    None          08/23/2024 - Pasquale Birch is a 34 y.o. male who  has no past medical history on file.  By history and examination this patient has chronic RIGHT low back and buttock pain  with RIGHT radiculopathy.  The underlying cause cause is degenerative disc disease, foraminal stenosis,  and central canal stenosis.  Pathology is confirmed by imaging.  We discussed the underlying diagnoses and multiple treatment options including non-opioid medications, interventional procedures, physical therapy, home exercise, core muscle enhancement, activity modification, and weight loss.  The risks and benefits of each treatment option were discussed and all questions were answered.      08/[id number]-[id number]-old male with a history of chronic right low back right buttocks and right radiculopathy into his right foot.  Recent lumbar MRI shows at L4-5 minimal left-sided neural foraminal narrowing.  At L5-S1 minimal diffuse broad-based disc bulge there is no central canal or neural foraminal narrowing at this level.  The patient has been suffering from chronic right low back right buttocks and right posterior leg pain for years he and I discussed although minimal pathology on the lumbar MRI that we could consider pain interventions in effort to help.  My initial thought was an epidural on the right to help with the radicular symptoms by secondary diagnosis is piriformis syndrome considering his current job as a  he may be experiencing pain from the piriformis causing sciatic pain radiating down his right leg.  Will consider a right piriformis injection prior to a right lumbar TF TEQUILA.      Treatment Plan:   Procedures:  Consider scheduling a right piriformis injection if unsuccessful schedule patient for a right L4-5  PT/OT/HEP: I have stressed the importance of physical activity and a home exercise plan to help with pain and improve health.  Medications:  Continue  Mobic 15 mg daily p.r.n.                         Continue Tylenol a 1000 mg t.i.d. p.r.n. not to exceed 3000 mg in 24 hr period   reviewed and medications are appropriately dosed for current hepatorenal function.   -  Not applicable  Imaging:  Lumbar MRI reviewed and discussed in detail today sharing images from patient's chart.    Follow  Up: 2-3 weeks p injection    Noel Alonzo, NP-C  [facility]    Disclaimer: This note was partly generated using dictation software which may occasionally result in transcription errors.

## 2024-08-23 NOTE — PROGRESS NOTES
[facility]  Patient Evaluation  telemedicine Encounter    Telemedicine Bundle:  This visit was completed during the Coronavirus Crisis to enhance patient safety.  The patient location is: patient's home  The chief complaint leading to consultation is: Low-back Pain and Leg Pain (Right buttock and posterior leg to foot )  Visit type: Virtual visit with synchronous audio and video  Total time spent with patient: 20 minutes  Each patient to whom he or she provides medical services by telemedicine is:    (1) informed of the relationship between the physician and patient and the respective role of any other health care provider with respect to management of the patient  (2) notified that he or she may decline to receive medical services by telemedicine and may withdraw from such care at any time.      Referred by: No ref. provider found   Reason for referral: * No diagnoses found *     CC:   Chief Complaint   Patient presents with    Low-back Pain    Leg Pain     Right buttock and posterior leg to foot          8/14/2024     8:38 AM   Last 3 PDI Scores   Pain Disability Index (PDI) 50       Interval history 8/23/2024:  34-year-old male that presents today virtually for a follow-up appointment to discuss his recent lumbar MRI.  The patient works as a  and he is continuing to suffer from chronic right low back right buttocks right posterior leg pain that radiates to his right foot.  His original injury occurred 7 years ago playing football he has attempted physical therapy for greater than 6 weeks he has been on multiple medications that have not helped sustain any type of relief.  The current pain is disrupting his quality of life he continues to report disruption in sleep his pain score today is 8/10.      Subjective 08/13/2024:   Pasquale Birch is a 34 y.o. male who presents complaining of right leg and right foot pain.  Patient reports a football injury 7 years  ago he reports falling multiple times and soon after experiencing right low back right buttocks and right leg pain that started intermittently but has increased to continued pain that is disrupting his quality of life and stopping him from performing his work duties as a .    Initial Pain Assessment:  Location: right leg and foot pain    Onset: 5 years  Current Pain Score: 8/10  Daily Pain of Range: 7-10/10  Quality: Aching, Burning, Throbbing, Grabbing, Tight, Tingling, Deep, and Hot  Radiation: right foot   Worsened by: exercise, lifting, lying down, sitting, standing for more than several minutes, walking for more than several minutes, and Daily activity   Improved by: nothing     Patient denies night fever/night sweats, urinary incontinence, bowel incontinence, significant weight loss, significant motor weakness, and loss of sensations.      Previous Interventions:  - n/a    Previous Therapies:  PT/OT:  Yes greater than 6 weeks with no relief  Chiropractor:  Yes which helped in the past  HEP:  Patient has been participating in a home exercise plan greater than 10-12 weeks that involves stretching, muscular strengthening none of which has helped reduce his symptoms.  He reports performing exercises given to him by his chiropractor.  Relevant Surgery: no     Previous Medications:   - Tylenol or NSAIDS: ibuprofen 800 mg BID Mobic 15 mg failed  - Muscle Relaxants: Flexeril 5 mg    - TCAs:   - SNRIs:   - Topicals:   - Anticonvulsants:  Gabapentin failed  - Opioids:   - Adjuvants:     Current Pain Medications:  Tylenol over-the-counter    Review of Systems:  Review of Systems   Musculoskeletal:  Positive for back pain.       GENERAL:  No weight loss, malaise or fevers.  HEENT:   No recent changes in vision or hearing  NECK:  No difficulty with swallowing. No stridor.   RESPIRATORY:  Negative for cough, wheezing or shortness of breath, patient denies any recent URI.  CARDIOVASCULAR:  Negative for chest  pain, leg swelling or palpitations.  GI:  Negative for abdominal discomfort, blood in stools or black stools or change in bowel habits.  MUSCULOSKELETAL:  See HPI.  SKIN:  Negative for lesions, rash, and itching.  PSYCH:  No mood disorder or recent psychosocial stressors.    HEMATOLOGY/LYMPHOLOGY:  Negative for prolonged bleeding, bruising easily or swollen nodes.  Patient is not currently taking any anti-coagulants  NEURO:   No history of headaches, syncope, paralysis, seizures or tremors.  All other reviewed and negative other than HPI.    History:  Current medications, allergies, medical history, surgical history,   family history, and social history were reviewed in the chart as marked.    Full Medication List:    Current Outpatient Medications:     fluticasone propionate (FLONASE) 50 mcg/actuation nasal spray, 2 sprays (100 mcg total) by Each Nostril route once daily., Disp: 16 g, Rfl: 2    meloxicam (MOBIC) 15 MG tablet, Take 1 tablet (15 mg total) by mouth once daily., Disp: 30 tablet, Rfl: 0     Allergies:  Patient has no known allergies.     Medical History:   has no past medical history on file.    Surgical History:   has no past surgical history on file.    Family History:  family history includes COPD in his sister; Diabetes in his maternal grandmother, mother, and paternal grandmother; Diabetes Mellitus in his maternal grandmother; Hyperlipidemia in his paternal grandmother; Hypertension in his maternal grandmother, mother, and paternal grandmother; No Known Problems in his brother, father, and maternal grandfather.    Social History:   reports that he has never smoked. He has never used smokeless tobacco. He reports that he does not currently use alcohol.    Physical Exam:  There were no vitals filed for this visit.  GEN: No acute distress. Calm, comfortable  HENT: Normocephalic, atraumatic, moist mucous membranes  EYE: Anicteric sclera, non-injected.   CV: Non-diaphoretic.   RESP: Breathing  comfortably. Chest expansion symmetric.  PSYCH: Pleasant mood and appropriate affect. Recent and remote memory intact.       GENERAL: Well appearing, in no acute distress, alert and oriented x3.  PSYCH:  Mood and affect appropriate.  SKIN: Skin color, texture, turgor normal, no rashes or lesions.  HEAD/FACE:  Normocephalic, atraumatic. Cranial nerves grossly intact.  NECK: Normal ROM. Supple.  No pain to palpation over the cervical paraspinous muscles. Spurling Negative. No pain with neck flexion, extension, or lateral rotation.   CV: RRR with palpation of the radial artery.  PULM: No evidence of respiratory difficulty, symmetric chest rise.  GI:  Soft and non-distended.  MSK: Straight leg raising is positive  to radicular pain RIGHT only.  Positive pain to palpation over the facet joints of the lumbar spine. No pain with lumbar facet loading. No pain over the SI joints. Sacral Thrust is negative. SAMINA test is negative.  Gaenslen Test is negative . No pain over the GBT bilaterally.  Moderate range of motion of the lumbar spine with with pain reproduction. + pain in the right buttock with palpation.  + Fair test, Peripheral joint ROM is full and pain free without obvious instability or laxity in all four extremities. No obvious deformities, edema, or skin discoloration.  No atrophy or tone abnormalities are noted.   NEURO: Bilateral upper and lower extremity coordination and strength is symmetric.  No loss of sensation is noted.  MENTAL STATUS: A x O x 3, good concentration, speech is fluent and goal directed  MOTOR: 5/5 in all muscle groups  GAIT: Normal. Ambulates unassisted.    Imaging:  EXAMINATION:  MRI LUMBAR SPINE WITHOUT CONTRAST     CLINICAL HISTORY:  Lumbar radiculopathy, symptoms persist with conservative treatment; Radiculopathy, lumbar region     TECHNIQUE:  Multiplanar, multisequence MR images were acquired from the thoracolumbar junction to the sacrum without the administration of contrast.      COMPARISON:  Plain film from 08/14/2024     FINDINGS:  Alignment: Normal.     Vertebrae: Normal marrow signal. No fracture.     Discs: Normal height and signal.     Cord: Normal.  Conus terminates at L1-2     Degenerative findings:     T12-L1: No focal disc bulge, spinal canal stenosis, or neural foraminal narrowing.     L1-L2: No focal disc bulge, spinal canal stenosis, or neural foraminal narrowing.     L2-L3: No focal disc bulge, spinal canal stenosis, or neural foraminal narrowing.     L3-L4: No focal disc bulge, spinal canal stenosis, or neural foraminal narrowing.     L4-L5: No focal disc bulge or spinal canal stenosis.  Minimal left-sided neural foraminal narrowing.  No right-sided neural foraminal narrowing.     L5-S1: Minimal diffuse broad-based disc bulge.  No central spinal canal stenosis or neural foraminal narrowing.     Paraspinal muscles & soft tissues: Unremarkable.     Impression:     Minimal degenerative changes of the lumbar spine, most prominent at L4-5 with minimal left-sided neural foraminal narrowing.    Labs:  BMP  Lab Results   Component Value Date     08/15/2022    K 3.7 08/15/2022     08/15/2022    CO2 27 08/15/2022    BUN 12 08/15/2022    CREATININE 0.8 08/15/2022    CALCIUM 9.4 08/15/2022    ANIONGAP 8 08/15/2022    EGFRNORACEVR >60.0 08/15/2022     Lab Results   Component Value Date    ALT 13 08/15/2022    AST 14 08/15/2022    ALKPHOS 81 08/15/2022    BILITOT 0.8 08/15/2022     Lab Results   Component Value Date    WBC 9.44 08/15/2022    HGB 15.0 08/15/2022    HCT 43.1 08/15/2022    MCV 88 08/15/2022     08/15/2022           Assessment:  Problem List Items Addressed This Visit    None          08/23/2024 - Pasquale Birch is a 34 y.o. male who  has no past medical history on file.  By history and examination this patient has chronic RIGHT low back and buttock pain  with RIGHT radiculopathy.  The underlying cause cause is degenerative disc disease, foraminal stenosis,  and central canal stenosis.  Pathology is confirmed by imaging.  We discussed the underlying diagnoses and multiple treatment options including non-opioid medications, interventional procedures, physical therapy, home exercise, core muscle enhancement, activity modification, and weight loss.  The risks and benefits of each treatment option were discussed and all questions were answered.      08/23/[id number]-old male with a history of chronic right low back right buttocks and right radiculopathy into his right foot.  Recent lumbar MRI shows at L4-5 minimal left-sided neural foraminal narrowing.  At L5-S1 minimal diffuse broad-based disc bulge there is no central canal or neural foraminal narrowing at this level.  The patient has been suffering from chronic right low back right buttocks and right posterior leg pain for years he and I discussed although minimal pathology on the lumbar MRI that we could consider pain interventions in effort to help.  My initial thought was an epidural on the right to help with the radicular symptoms by secondary diagnosis is piriformis syndrome considering his current job as a  he may be experiencing pain from the piriformis causing sciatic pain radiating down his right leg.  Will consider a right piriformis injection prior to a right lumbar TF TEQUILA.      Treatment Plan:   Procedures:  Consider scheduling a right piriformis injection if unsuccessful schedule patient for a right L4-5  PT/OT/HEP: I have stressed the importance of physical activity and a home exercise plan to help with pain and improve health.  Medications:  Continue  Mobic 15 mg daily p.r.n.                         Continue Tylenol a 1000 mg t.i.d. p.r.n. not to exceed 3000 mg in 24 hr period   reviewed and medications are appropriately dosed for current hepatorenal function.   -  Not applicable  Imaging:  Lumbar MRI reviewed and discussed in detail today sharing images from patient's chart.    Follow  Up: 2-3 weeks p injection    Noel Alonzo, NP-C  [facility]    Disclaimer: This note was partly generated using dictation software which may occasionally result in transcription errors.

## 2024-08-26 RX ORDER — OXYCODONE AND ACETAMINOPHEN 5; 325 MG/1; MG/1
1 TABLET ORAL 3 TIMES DAILY PRN
Qty: 90 TABLET | Refills: 0 | Status: SHIPPED | OUTPATIENT
Start: 2024-09-25 | End: 2024-10-25

## 2024-08-26 RX ORDER — OXYCODONE AND ACETAMINOPHEN 5; 325 MG/1; MG/1
1 TABLET ORAL 3 TIMES DAILY PRN
Qty: 90 TABLET | Refills: 0 | Status: SHIPPED | OUTPATIENT
Start: 2024-08-26 | End: 2024-09-25

## 2024-08-26 RX ORDER — OXYCODONE AND ACETAMINOPHEN 5; 325 MG/1; MG/1
1 TABLET ORAL 3 TIMES DAILY PRN
Qty: 90 TABLET | Refills: 0 | Status: SHIPPED | OUTPATIENT
Start: 2024-10-25 | End: 2024-11-24

## 2024-08-27 ENCOUNTER — TELEPHONE (OUTPATIENT)
Dept: PAIN MEDICINE | Facility: CLINIC | Age: 34
End: 2024-08-27
Payer: COMMERCIAL

## 2024-08-30 NOTE — PRE-PROCEDURE INSTRUCTIONS
Patient reviewed on 8/27/2024.  Okay to proceed at [facility]. The following pre-procedure instructions and arrival time have been reviewed with patient via phone and sent to patient portal for review.  Patient verbalized an understanding.        Dear Pasquale ,     Please read over the following pre-procedure instructions in it's entirety as there is helpful information here to get you well prepared for your upcoming procedure.              You are scheduled for a procedure with Dr. Guerin on 9/4/2024.      Please wear comfortable clothes. You will be placed in a gown for your procedure.  Please do not wear a dress.  This procedure will take place at the [facility] at the corner of [facility] [address] [facility].  It is in the [facility] [facility] next to [facility].  The address is:     [address] [place] [address].  [place] [id number]     After entering the building, you will proceed to the second floor where you can check in with registration. You should take any medications that you routinely take for blood pressure, heart medications, thyroid, cholesterol, etc.      The fasting restrictions are dependent on whether or not you are receiving sedation.  Sedation is not available for all procedures.      Your fasting instructions are as follow:  No sedation.  You do not need to fast before this procedure.  You can eat and drink like normal.  You can drive yourself (with stipulations).  There are some rare cases where you may need to call an uber if you are unable to drive after the procedure due to weakness/dizziness.      If you are on blood thinners, you need to follow the anticoagulation instructions that had been discussed previously.  You should only stop the blood thinners if it was approved by your primary care physician or your cardiologist.  In the event that you are not able to stop your blood thinners, a blood thinner was not listed on your medication list, or we were not able to get  clearance from your cardiologist, then the procedure may have to be postponed/canceled.      IF you were told to stop your blood thinners, this is how long you should generally hold some of the more common ones.  Remember that stopping blood thinners is only necessary for certain procedures. If you are unsure of your instructions, please call us.   Aspirin - 5 days  Plavix/Clopidogrel - 7 days  Warfarin / Coumadin - 5 days  Eliquis - 3 days  Pradaxa/Dabigatran - 4 days  Xarelto/Rivaroxaban - 3 days     If you are a diabetic, do not take your medication if you will be fasting, but bring it with you. Please plan on being here for roughly 3 hours.     Please call us if you have been sick (running fever, having any flu-like symptoms) or have been taking ANTIBIOTICS in the past 2 weeks or had any outpatient procedures other than with us (colonoscopy, endoscopy, OBGYN, dental, etc.).     If you have been previously COVID positive, you will need to hold off on your procedure until you are symptom free for 10 days. If you did not have any symptoms, you can have your procedure 10 days from your positive test result.       On the morning of your procedure:  *HOLD ALL VITAMINS, MINERALS, HERBS (INCLUDING HERBAL TEAS) AND SUPPLEMENTS  *SHOWER WITH ANTIBACTERIAL SOAP (EX. DIAL) NIGHT BEFORE AND MORNING OF PROCEDURE  *DO NOT APPLY ANY LOTIONS, OILS, POWDERS, PERFUME/COLOGNE, OINTMENTS, GELS, CREAMS, MAKEUP OR DEODORANT TO YOUR SKIN MORNING OF PROCEDURE  *LEAVE JEWELRY AND ANY VALUABLES AT HOME  *WEAR LOOSE COMFORTABLE CLOTHING (PREFERABLY A BUTTON UP SHIRT)     Please reply to this message as receipt of delivery.  Your scheduled arrival time is 2:40 pm.  This arrival time is roughly 1 hour before your anticipated procedure time to allow sufficient time for pre-op..        Thank you,  [facility]  [place], LPN [facility]

## 2024-09-04 ENCOUNTER — HOSPITAL ENCOUNTER (OUTPATIENT)
Facility: HOSPITAL | Age: 34
Discharge: HOME | End: 2024-09-04
Attending: STUDENT IN AN ORGANIZED HEALTH CARE EDUCATION/TRAINING PROGRAM | Admitting: STUDENT IN AN ORGANIZED HEALTH CARE EDUCATION/TRAINING PROGRAM
Payer: COMMERCIAL

## 2024-09-04 VITALS
RESPIRATION RATE: 16 BRPM | WEIGHT: 185 LBS | HEIGHT: 66 IN | HEART RATE: 50 BPM | DIASTOLIC BLOOD PRESSURE: 83 MMHG | OXYGEN SATURATION: 100 % | BODY MASS INDEX: 29.73 KG/M2 | TEMPERATURE: 98 F | SYSTOLIC BLOOD PRESSURE: 138 MMHG

## 2024-09-04 DIAGNOSIS — G89.29 CHRONIC PAIN: ICD-10-CM

## 2024-09-04 DIAGNOSIS — G57.00 PIRIFORMIS SYNDROME, UNSPECIFIED LATERALITY: Primary | ICD-10-CM

## 2024-09-04 PROCEDURE — 20552 NJX 1/MLT TRIGGER POINT 1/2: CPT | Mod: ,,, | Performed by: STUDENT IN AN ORGANIZED HEALTH CARE EDUCATION/TRAINING PROGRAM

## 2024-09-04 PROCEDURE — 25000003 PHARM REV CODE 250: Performed by: STUDENT IN AN ORGANIZED HEALTH CARE EDUCATION/TRAINING PROGRAM

## 2024-09-04 PROCEDURE — 63600175 PHARM REV CODE 636 W HCPCS: Mod: JZ,JG | Performed by: STUDENT IN AN ORGANIZED HEALTH CARE EDUCATION/TRAINING PROGRAM

## 2024-09-04 PROCEDURE — 20552 NJX 1/MLT TRIGGER POINT 1/2: CPT | Mod: RT | Performed by: STUDENT IN AN ORGANIZED HEALTH CARE EDUCATION/TRAINING PROGRAM

## 2024-09-04 PROCEDURE — 25500020 PHARM REV CODE 255: Performed by: STUDENT IN AN ORGANIZED HEALTH CARE EDUCATION/TRAINING PROGRAM

## 2024-09-04 RX ORDER — BUPIVACAINE HYDROCHLORIDE 2.5 MG/ML
INJECTION, SOLUTION EPIDURAL; INFILTRATION; INTRACAUDAL
Status: DISCONTINUED | OUTPATIENT
Start: 2024-09-04 | End: 2024-09-04 | Stop reason: HOSPADM

## 2024-09-04 RX ORDER — LIDOCAINE HYDROCHLORIDE 20 MG/ML
INJECTION, SOLUTION EPIDURAL; INFILTRATION; INTRACAUDAL; PERINEURAL
Status: DISCONTINUED | OUTPATIENT
Start: 2024-09-04 | End: 2024-09-04 | Stop reason: HOSPADM

## 2024-09-04 RX ORDER — TRIAMCINOLONE ACETONIDE 40 MG/ML
INJECTION, SUSPENSION INTRA-ARTICULAR; INTRAMUSCULAR
Status: DISCONTINUED | OUTPATIENT
Start: 2024-09-04 | End: 2024-09-04 | Stop reason: HOSPADM

## 2024-09-04 RX ORDER — ALPRAZOLAM 0.5 MG/1
0.5 TABLET, ORALLY DISINTEGRATING ORAL
Status: DISCONTINUED | OUTPATIENT
Start: 2024-09-04 | End: 2024-09-04 | Stop reason: HOSPADM

## 2024-09-04 NOTE — OP NOTE
Piriformis Muscle Injection under Fluoroscopic Guidance    The procedure, risks, benefits, and options were discussed with the patient. There are no contraindications to the procedure. The patent expressed understanding and agreed to the procedure. Informed written consent was obtained prior to the start of the procedure and can be found in the patient's chart.    PATIENT NAME: Pasquale Birch   MRN: [id number]     DATE OF PROCEDURE: 09/04/2024    PROCEDURE: Right Piriformis Muscle Injection under Fluoroscopic Guidance    PRE-OP DIAGNOSIS: Piriformis syndrome, right [G57.01]  Lumbar radiculopathy, chronic [M54.16]  DDD (degenerative disc disease), lumbar [M51.36]    POST-OP DIAGNOSIS: Same    PHYSICIAN: Yvette Guerin DO    ASSISTANTS: None     MEDICATIONS INJECTED: Preservative-free Kenalog 40mg with 4 cc of Bupivacaine 0.25%     LOCAL ANESTHETIC INJECTED: Xylocaine 2%     SEDATION: None    ESTIMATED BLOOD LOSS: None    COMPLICATIONS: None    TECHNIQUE: Time-out was performed to identify the patient and procedure to be performed. With the patient laying in a prone position, the surgical area was prepped and draped in the usual sterile fashion using ChloraPrep and a fenestrated drape.The area overlying the right piriformis muscle was identified under fluoroscopy in the AP view. Skin anesthesia was achieved by injecting Lidocaine 2% over the injection sites. A 22 gauge,  3.5 inch spinal quinke needle was then advanced 1 cm inferior and 1 cm lateral and 1 cm deeper to the inferior aspect of the SI joint. Once the piriformis muscle was thought to be encountered, Contrast dye  Omnipaque (300mg/mL) was injected to confirm placement and there was no vascular runoff. Confirmation of spread was identified within the piriformis muscle using AP fluoroscopic views. Then  5 mL of the medication mixture listed above was injected slowly. The needles were removed and bleeding was nil. A sterile dressing was applied. No specimens  collected. The patient tolerated the procedure well.       The patient was monitored after the procedure in the recovery area. They were given post-procedure and discharge instructions to follow at home. The patient was discharged in a stable condition.        Yvette Guerin DO

## 2024-09-04 NOTE — DISCHARGE INSTRUCTIONS
Home Care Instructions Pain Management:    1.  DIET:    You may resume your normal diet today.    2.  BATHING:    You may shower with luke warm water.    3.  DRESSING:    You may remove your bandage today.    4.  ACTIVITY LEVEL:      You may resume your normal activities 24 hours after your procedure.    5.  MEDICATIONS:    You may resume your normal medications today.    6.  SPECIAL INSTRUCTIONS:    No heat to the injection site for 24 hours including bath or shower, heating pad, moist heat or hot tubs.    Use an ice pack to the injection site for any pain or discomfort.  Apply ice packs for 20 minute intervals as needed.    If you have received any sedatives by mouth today, you can not drive for 12 hours.    If you have received sedation through an IV, you can not drive for 24 hours.    PLEASE CALL YOUR DOCTOR FOR THE FOLLOWIN.  Redness or swelling around the injection site.  2.  Fever of 101 degrees.  3.  Drainage (pus) from the injection site.  4.  For any continuous bleeding (some dried blood over the incision is normal.)    FOR EMERGENCIES:    If any unusual problems or difficulties occur during clinic hours, call [phone] or dial [phone].    Follow up with with your physician in 2-3 weeks.

## 2024-09-04 NOTE — DISCHARGE SUMMARY
Discharge Note  Short Stay      SUMMARY     Admit Date: 9/4/2024    Attending Physician: Yvette Guerin      Discharge Physician: Yvette Guerin      Discharge Date: 9/4/2024 4:06 PM    Procedure(s) (LRB):  RT Piriformis (Right)    Final Diagnosis: Piriformis syndrome, right [G57.01]  Lumbar radiculopathy, chronic [M54.16]  DDD (degenerative disc disease), lumbar [M51.36]    Disposition: Home or self care    Patient Instructions:   Current Discharge Medication List        CONTINUE these medications which have NOT CHANGED    Details   fluticasone propionate (FLONASE) 50 mcg/actuation nasal spray 2 sprays (100 mcg total) by Each Nostril route once daily.  Qty: 16 g, Refills: 2    Associated Diagnoses: Ear discomfort, right      meloxicam (MOBIC) 15 MG tablet Take 1 tablet (15 mg total) by mouth once daily.  Qty: 30 tablet, Refills: 0    Associated Diagnoses: Lumbar radiculopathy, chronic; DDD (degenerative disc disease), lumbar      !! oxyCODONE-acetaminophen (PERCOCET) 5-325 mg per tablet Take 1 tablet by mouth 3 (three) times daily as needed for Pain.  Qty: 90 tablet, Refills: 0    Comments: Quantity prescribed more than 7 day supply? Yes, quantity medically necessary      !! oxyCODONE-acetaminophen (PERCOCET) 5-325 mg per tablet Take 1 tablet by mouth 3 (three) times daily as needed for Pain.  Qty: 90 tablet, Refills: 0    Comments: Quantity prescribed more than 7 day supply? Yes, quantity medically necessary      !! oxyCODONE-acetaminophen (PERCOCET) 5-325 mg per tablet Take 1 tablet by mouth 3 (three) times daily as needed for Pain.  Qty: 90 tablet, Refills: 0    Comments: Quantity prescribed more than 7 day supply? Yes, quantity medically necessary       !! - Potential duplicate medications found. Please discuss with provider.              Discharge Diagnosis: Piriformis syndrome, right [G57.01]  Lumbar radiculopathy, chronic [M54.16]  DDD (degenerative disc disease), lumbar [M51.36]  Condition on  Discharge: Stable with no complications to procedure   Diet on Discharge: Same as before.  Activity: as per instruction sheet.  Discharge to: Home with a responsible adult.  Follow up: 2-4 weeks       Please call my office or pager at [phone] if experienced any weakness or loss of sensation, fever > 101.5, pain uncontrolled with oral medications, persistent nausea/vomiting/or diarrhea, redness or drainage from the incisions, or any other worrisome concerns. If physician on call was not reached or could not communicate with our office for any reason please go to the nearest emergency department

## 2024-09-04 NOTE — PLAN OF CARE
Pasquale Birch has met all discharge criteria from Phase II. Vital Signs are stable, ambulating  without difficulty. Discharge instructions given, patient verbalized understanding. Discharged from facility via ambulation in stable condition.

## 2024-09-04 NOTE — PLAN OF CARE
Pt in preop bay 29, VSS, Pt denies any open wounds on body or the use of any weight loss injections. Patient ready to roll.

## 2025-08-21 ENCOUNTER — HOSPITAL ENCOUNTER (EMERGENCY)
Facility: HOSPITAL | Age: 35
Discharge: HOME | End: 2025-08-21
Attending: EMERGENCY MEDICINE
Payer: COMMERCIAL

## 2025-08-21 VITALS
TEMPERATURE: 98 F | HEIGHT: 65 IN | DIASTOLIC BLOOD PRESSURE: 86 MMHG | RESPIRATION RATE: 16 BRPM | HEART RATE: 71 BPM | OXYGEN SATURATION: 99 % | SYSTOLIC BLOOD PRESSURE: 147 MMHG | WEIGHT: 180 LBS | BODY MASS INDEX: 29.99 KG/M2

## 2025-08-21 DIAGNOSIS — M25.572 ACUTE LEFT ANKLE PAIN: Primary | ICD-10-CM

## 2025-08-21 DIAGNOSIS — S99.929A FOOT INJURY: ICD-10-CM

## 2025-08-21 PROCEDURE — 99283 EMERGENCY DEPT VISIT LOW MDM: CPT | Mod: 25,ER

## 2025-08-21 PROCEDURE — 25000003 PHARM REV CODE 250: Mod: ER

## 2025-08-21 RX ORDER — ACETAMINOPHEN 500 MG
1000 TABLET ORAL
Status: COMPLETED | OUTPATIENT
Start: 2025-08-21 | End: 2025-08-21

## 2025-08-21 RX ORDER — IBUPROFEN 800 MG/1
800 TABLET, FILM COATED ORAL EVERY 6 HOURS PRN
Qty: 30 TABLET | Refills: 0 | Status: SHIPPED | OUTPATIENT
Start: 2025-08-21

## 2025-08-21 RX ADMIN — ACETAMINOPHEN 1000 MG: 500 TABLET, FILM COATED ORAL at 06:08
